# Patient Record
Sex: MALE | Race: WHITE | NOT HISPANIC OR LATINO | Employment: OTHER | URBAN - METROPOLITAN AREA
[De-identification: names, ages, dates, MRNs, and addresses within clinical notes are randomized per-mention and may not be internally consistent; named-entity substitution may affect disease eponyms.]

---

## 2023-04-25 ENCOUNTER — OFFICE VISIT (OUTPATIENT)
Dept: PRIMARY CARE | Facility: CLINIC | Age: 78
End: 2023-04-25
Payer: MEDICARE

## 2023-04-25 VITALS
HEART RATE: 99 BPM | TEMPERATURE: 97.6 F | WEIGHT: 260 LBS | BODY MASS INDEX: 41.78 KG/M2 | OXYGEN SATURATION: 95 % | SYSTOLIC BLOOD PRESSURE: 112 MMHG | RESPIRATION RATE: 16 BRPM | HEIGHT: 66 IN | DIASTOLIC BLOOD PRESSURE: 76 MMHG

## 2023-04-25 DIAGNOSIS — Z95.2 HISTORY OF HEART VALVE REPLACEMENT: ICD-10-CM

## 2023-04-25 DIAGNOSIS — Z13.39 ALCOHOL SCREENING: ICD-10-CM

## 2023-04-25 DIAGNOSIS — E66.01 CLASS 2 SEVERE OBESITY WITH BODY MASS INDEX (BMI) OF 35 TO 39.9 WITH SERIOUS COMORBIDITY (MULTI): ICD-10-CM

## 2023-04-25 DIAGNOSIS — Z00.00 ANNUAL PHYSICAL EXAM: ICD-10-CM

## 2023-04-25 DIAGNOSIS — Z79.4 TYPE 2 DIABETES MELLITUS WITHOUT COMPLICATION, WITH LONG-TERM CURRENT USE OF INSULIN (MULTI): Primary | ICD-10-CM

## 2023-04-25 DIAGNOSIS — E78.00 HYPERCHOLESTEROLEMIA: ICD-10-CM

## 2023-04-25 DIAGNOSIS — E55.9 VITAMIN D DEFICIENCY: ICD-10-CM

## 2023-04-25 DIAGNOSIS — Z00.00 MEDICARE ANNUAL WELLNESS VISIT, SUBSEQUENT: Primary | ICD-10-CM

## 2023-04-25 DIAGNOSIS — E11.9 TYPE 2 DIABETES MELLITUS WITHOUT COMPLICATION, WITHOUT LONG-TERM CURRENT USE OF INSULIN (MULTI): ICD-10-CM

## 2023-04-25 DIAGNOSIS — E78.5 HYPERLIPIDEMIA, UNSPECIFIED HYPERLIPIDEMIA TYPE: ICD-10-CM

## 2023-04-25 DIAGNOSIS — I10 HYPERTENSION, UNSPECIFIED TYPE: ICD-10-CM

## 2023-04-25 DIAGNOSIS — I48.20 CHRONIC ATRIAL FIBRILLATION (MULTI): ICD-10-CM

## 2023-04-25 DIAGNOSIS — E11.9 TYPE 2 DIABETES MELLITUS WITHOUT COMPLICATION, WITH LONG-TERM CURRENT USE OF INSULIN (MULTI): Primary | ICD-10-CM

## 2023-04-25 DIAGNOSIS — Z13.31 DEPRESSION SCREEN: ICD-10-CM

## 2023-04-25 DIAGNOSIS — Z71.89 ADVANCE DIRECTIVE DISCUSSED WITH PATIENT: ICD-10-CM

## 2023-04-25 PROBLEM — E53.8 VITAMIN B 12 DEFICIENCY: Status: ACTIVE | Noted: 2023-04-25

## 2023-04-25 PROBLEM — Z98.1 S/P CERVICAL SPINAL FUSION: Status: ACTIVE | Noted: 2023-04-25

## 2023-04-25 PROBLEM — S06.5XAA SUBDURAL HEMATOMA (MULTI): Status: ACTIVE | Noted: 2023-04-25

## 2023-04-25 PROBLEM — R26.9 GAIT DIFFICULTY: Status: ACTIVE | Noted: 2023-04-25

## 2023-04-25 PROBLEM — C61 PROSTATE CANCER (MULTI): Status: ACTIVE | Noted: 2023-04-25

## 2023-04-25 PROBLEM — I48.0 PAF (PAROXYSMAL ATRIAL FIBRILLATION) (MULTI): Status: ACTIVE | Noted: 2023-04-25

## 2023-04-25 PROBLEM — Z86.008 HISTORY OF CARCINOMA IN SITU OF BLADDER: Status: ACTIVE | Noted: 2023-04-25

## 2023-04-25 PROBLEM — N40.0 BPH (BENIGN PROSTATIC HYPERPLASIA): Status: ACTIVE | Noted: 2023-04-25

## 2023-04-25 PROBLEM — D64.9 ANEMIA: Status: ACTIVE | Noted: 2023-04-25

## 2023-04-25 PROBLEM — I25.10 CORONARY ARTERIOSCLEROSIS: Status: ACTIVE | Noted: 2023-04-25

## 2023-04-25 PROBLEM — E66.812 CLASS 2 SEVERE OBESITY WITH BODY MASS INDEX (BMI) OF 35 TO 39.9 WITH SERIOUS COMORBIDITY: Status: ACTIVE | Noted: 2023-04-25

## 2023-04-25 PROCEDURE — 1157F ADVNC CARE PLAN IN RCRD: CPT | Performed by: INTERNAL MEDICINE

## 2023-04-25 PROCEDURE — 99397 PER PM REEVAL EST PAT 65+ YR: CPT | Performed by: INTERNAL MEDICINE

## 2023-04-25 PROCEDURE — G0444 DEPRESSION SCREEN ANNUAL: HCPCS | Performed by: INTERNAL MEDICINE

## 2023-04-25 PROCEDURE — 1170F FXNL STATUS ASSESSED: CPT | Performed by: INTERNAL MEDICINE

## 2023-04-25 PROCEDURE — G0442 ANNUAL ALCOHOL SCREEN 15 MIN: HCPCS | Performed by: INTERNAL MEDICINE

## 2023-04-25 PROCEDURE — 1036F TOBACCO NON-USER: CPT | Performed by: INTERNAL MEDICINE

## 2023-04-25 PROCEDURE — 1160F RVW MEDS BY RX/DR IN RCRD: CPT | Performed by: INTERNAL MEDICINE

## 2023-04-25 PROCEDURE — 3074F SYST BP LT 130 MM HG: CPT | Performed by: INTERNAL MEDICINE

## 2023-04-25 PROCEDURE — G0446 INTENS BEHAVE THER CARDIO DX: HCPCS | Performed by: INTERNAL MEDICINE

## 2023-04-25 PROCEDURE — 99497 ADVNCD CARE PLAN 30 MIN: CPT | Performed by: INTERNAL MEDICINE

## 2023-04-25 PROCEDURE — 3078F DIAST BP <80 MM HG: CPT | Performed by: INTERNAL MEDICINE

## 2023-04-25 PROCEDURE — 1159F MED LIST DOCD IN RCRD: CPT | Performed by: INTERNAL MEDICINE

## 2023-04-25 PROCEDURE — G0439 PPPS, SUBSEQ VISIT: HCPCS | Performed by: INTERNAL MEDICINE

## 2023-04-25 PROCEDURE — 99213 OFFICE O/P EST LOW 20 MIN: CPT | Performed by: INTERNAL MEDICINE

## 2023-04-25 RX ORDER — TORSEMIDE 10 MG/1
10 TABLET ORAL DAILY
COMMUNITY
End: 2023-04-25 | Stop reason: SDUPTHER

## 2023-04-25 RX ORDER — METFORMIN HYDROCHLORIDE 500 MG/1
2000 TABLET, EXTENDED RELEASE ORAL DAILY
COMMUNITY
End: 2023-04-25 | Stop reason: SDUPTHER

## 2023-04-25 RX ORDER — WARFARIN SODIUM 2 MG/1
TABLET ORAL
COMMUNITY
End: 2023-04-25 | Stop reason: SDUPTHER

## 2023-04-25 RX ORDER — METOPROLOL TARTRATE 50 MG/1
1 TABLET ORAL 2 TIMES DAILY
COMMUNITY
Start: 2021-06-07 | End: 2023-04-25 | Stop reason: SDUPTHER

## 2023-04-25 RX ORDER — POTASSIUM CHLORIDE 750 MG/1
10 CAPSULE, EXTENDED RELEASE ORAL DAILY
COMMUNITY
End: 2023-04-25 | Stop reason: SDUPTHER

## 2023-04-25 RX ORDER — CHOLECALCIFEROL (VITAMIN D3) 25 MCG
1 TABLET ORAL DAILY
COMMUNITY
Start: 2019-10-23 | End: 2023-04-25 | Stop reason: SDUPTHER

## 2023-04-25 RX ORDER — TAMSULOSIN HYDROCHLORIDE 0.4 MG/1
0.4 CAPSULE ORAL DAILY
COMMUNITY
End: 2023-04-25 | Stop reason: SDUPTHER

## 2023-04-25 RX ORDER — SIMVASTATIN 40 MG/1
40 TABLET, FILM COATED ORAL DAILY
COMMUNITY
End: 2023-04-25 | Stop reason: SDUPTHER

## 2023-04-25 RX ORDER — LANOLIN ALCOHOL/MO/W.PET/CERES
1 CREAM (GRAM) TOPICAL DAILY
COMMUNITY
Start: 2021-06-21 | End: 2023-04-25 | Stop reason: SDUPTHER

## 2023-04-25 ASSESSMENT — PATIENT HEALTH QUESTIONNAIRE - PHQ9
SUM OF ALL RESPONSES TO PHQ9 QUESTIONS 1 AND 2: 0
1. LITTLE INTEREST OR PLEASURE IN DOING THINGS: NOT AT ALL
2. FEELING DOWN, DEPRESSED OR HOPELESS: NOT AT ALL

## 2023-04-25 ASSESSMENT — ACTIVITIES OF DAILY LIVING (ADL)
JUDGMENT_ADEQUATE_SAFELY_COMPLETE_DAILY_ACTIVITIES: YES
BATHING: INDEPENDENT
TOILETING: INDEPENDENT
GROOMING: INDEPENDENT
FEEDING YOURSELF: INDEPENDENT
PATIENT'S MEMORY ADEQUATE TO SAFELY COMPLETE DAILY ACTIVITIES?: YES
ADEQUATE_TO_COMPLETE_ADL: YES
HEARING - LEFT EAR: FUNCTIONAL
HEARING - RIGHT EAR: FUNCTIONAL
DRESSING YOURSELF: INDEPENDENT
WALKS IN HOME: INDEPENDENT

## 2023-04-25 ASSESSMENT — PAIN SCALES - GENERAL: PAINLEVEL: 0-NO PAIN

## 2023-04-25 NOTE — PATIENT INSTRUCTIONS
Plan:   Medicare wellness done  Physical done. Tests ordered  Upto date on pneumonia shots   Discussed about obesity and complications related to it. Discussed about weight reduction and regular exercise to decrease weight. Questions related to it answered to patient's satisfaction.   Current medications are effective. advised to continue current medications.  Blood tests ordered   F/U 6 months or as needed

## 2023-04-25 NOTE — PROGRESS NOTES
Pt is here for medicare wellness , ;physical and follow up     Here with wife    Hx of HTN , HLD, DM . Uses cane    Patient is independent with ADLs. Patient does drive.     No recent falls.    Patient denies any shortness of breath, PND, orthopnea, chest pain , palpitation, syncope or edema in legs  patient denies any abdominal pain, tenderness, nausea, vomiting, change in bowel habits or blood in stool.  Patient denies any weakness in extremities.. Denies any headache, visual symptoms , speech problems or  tremors . No TIA or stroke like symptoms..    During Medicare wellness apart from looking at assessment done by nurse,  I also asked following :    Alcohol use : Alcohol screening  was  done during this visit. Patient is not having any issue with increase alcohol use . No ETOH abuse was observed by history . Does not drink at all     HIV test: patient was not found to be high risk for HIV     Cognitive issue : None     Advanced Directive: Patient has advanced directive including living will and Health care power of . Health POA is wife . All questions related to it answered. Total time > 16 min.     I have reviewed all active medications patient is currently on . Questions related to medication answered to patient's satisfaction.    REVIEW OF SYSTEMS:   All other systems have been reviewed and are negative in relation to patient's complaint and other than what is mentioned in History of present illness.    Had pneumonia and shingle shots in pasts .     OBJECTIVE :  Morbidly obese  Vitals noted   Not in acute distress  Conj Pink, No icterus  Slight wax in ears  Neck:No Cervical LN enlargement, No Thyroid enlargement No carotid bruit  Lungs: good air entry bilaterally, no rales or rhonchi  CVS: S1 S2 + , no S3. No loud heart murmur heard.   Abdomen: Soft, non tender , BS +. no organomegaly , no CVA tenderness  MSK: No spine tenderness or muscle tenderness noted on gross examination  CNS: Pt is alert,  moving all 4 extremities. no motor weakness or abnormal movements noted on gross examination.  Extremities: No edema, No calf tenderness, Matthias's sign negative.    Assessment:    1. Medicare annual wellness visit, subsequent - done   2. Annual physical exam done. Tests ordered   3. Advance directive discussed with patient    4. Alcohol screening - done   5. Depression screen done   6. Hypertension, unspecified type - controlled     7. Hypercholesterolemia - hx of   8. Class 2 severe obesity with body mass index (BMI) of 35 to 39.9 with serious comorbidity (CMS/[facility])    9. History of heart valve replacement  -sees cardiologist . On coumadin   10. Type 2 diabetes mellitus without complication, without long-ter  m current use of insulin (CMS/[facility]) hx of .        During Medicare wellness patients chronic diagnosis were reviewed and questions related to it answered to patient's satisfaction . Risk factors for heart, stroke were discussed with patient . Discussion about preventative tests were done with patient also . pain issue was discussed as appropriate for patient .  ASCVD score was about 49 %. Discussed with pt     Plan:   Medicare wellness done  Physical done. Tests ordered  Upto date on pneumonia shots   Discussed about obesity and complications related to it. Discussed about weight reduction and regular exercise to decrease weight. Questions related to it answered to patient's satisfaction.   Current medications are effective. advised to continue current medications.  Blood tests ordered   F/U 6 months or as needed

## 2023-04-26 RX ORDER — POTASSIUM CHLORIDE 750 MG/1
10 CAPSULE, EXTENDED RELEASE ORAL DAILY
Qty: 90 CAPSULE | Refills: 3 | Status: SHIPPED | OUTPATIENT
Start: 2023-04-26 | End: 2023-10-17 | Stop reason: SDUPTHER

## 2023-04-26 RX ORDER — TAMSULOSIN HYDROCHLORIDE 0.4 MG/1
0.4 CAPSULE ORAL DAILY
Qty: 30 CAPSULE | Refills: 2 | Status: SHIPPED | OUTPATIENT
Start: 2023-04-26 | End: 2023-11-29 | Stop reason: SDUPTHER

## 2023-04-26 RX ORDER — METFORMIN HYDROCHLORIDE 500 MG/1
2000 TABLET, EXTENDED RELEASE ORAL DAILY
Qty: 90 TABLET | Refills: 3 | Status: SHIPPED | OUTPATIENT
Start: 2023-04-26 | End: 2023-10-17 | Stop reason: SDUPTHER

## 2023-04-26 RX ORDER — CHOLECALCIFEROL (VITAMIN D3) 25 MCG
25 TABLET ORAL DAILY
Qty: 90 TABLET | Refills: 3 | Status: SHIPPED | OUTPATIENT
Start: 2023-04-26 | End: 2023-11-29 | Stop reason: SDUPTHER

## 2023-04-26 RX ORDER — TORSEMIDE 10 MG/1
10 TABLET ORAL DAILY
Qty: 30 TABLET | Refills: 2 | Status: SHIPPED | OUTPATIENT
Start: 2023-04-26 | End: 2023-10-17 | Stop reason: SDUPTHER

## 2023-04-26 RX ORDER — METOPROLOL TARTRATE 50 MG/1
50 TABLET ORAL 2 TIMES DAILY
Qty: 60 TABLET | Refills: 2 | Status: SHIPPED | OUTPATIENT
Start: 2023-04-26 | End: 2023-10-17 | Stop reason: SDUPTHER

## 2023-04-26 RX ORDER — WARFARIN SODIUM 2 MG/1
TABLET ORAL
Qty: 100 TABLET | Refills: 3 | Status: SHIPPED | OUTPATIENT
Start: 2023-04-26 | End: 2023-11-29 | Stop reason: WASHOUT

## 2023-04-26 RX ORDER — SIMVASTATIN 40 MG/1
40 TABLET, FILM COATED ORAL DAILY
Qty: 30 TABLET | Refills: 2 | Status: SHIPPED | OUTPATIENT
Start: 2023-04-26 | End: 2023-11-29 | Stop reason: SDUPTHER

## 2023-04-26 RX ORDER — LANOLIN ALCOHOL/MO/W.PET/CERES
1000 CREAM (GRAM) TOPICAL DAILY
Qty: 90 TABLET | Refills: 3 | Status: SHIPPED | OUTPATIENT
Start: 2023-04-26 | End: 2023-11-29 | Stop reason: SDUPTHER

## 2023-08-21 PROBLEM — E87.6 HYPOKALEMIA: Status: ACTIVE | Noted: 2023-08-21

## 2023-08-21 PROBLEM — N20.0 KIDNEY STONE: Status: ACTIVE | Noted: 2023-08-21

## 2023-08-21 PROBLEM — H35.81 RETINAL EDEMA OF RIGHT EYE: Status: ACTIVE | Noted: 2023-08-21

## 2023-08-21 PROBLEM — S29.9XXA THORACIC INJURY: Status: ACTIVE | Noted: 2023-08-21

## 2023-08-21 PROBLEM — R60.0 EDEMA OF BOTH LEGS: Status: ACTIVE | Noted: 2023-08-21

## 2023-08-21 PROBLEM — R53.1 WEAKNESS: Status: ACTIVE | Noted: 2023-08-21

## 2023-08-21 PROBLEM — R13.10 DYSPHAGIA: Status: ACTIVE | Noted: 2021-04-13

## 2023-08-21 RX ORDER — ACETAMINOPHEN 500 MG
1 TABLET ORAL DAILY
COMMUNITY
Start: 2021-06-07 | End: 2023-10-17 | Stop reason: SDUPTHER

## 2023-08-21 RX ORDER — ACETAMINOPHEN 325 MG/1
3 TABLET ORAL EVERY 8 HOURS
COMMUNITY
Start: 2021-06-07 | End: 2023-11-29 | Stop reason: WASHOUT

## 2023-08-21 RX ORDER — WARFARIN 2 MG/1
TABLET ORAL
COMMUNITY
Start: 2009-10-20 | End: 2023-10-17 | Stop reason: SDUPTHER

## 2023-08-21 RX ORDER — METOPROLOL TARTRATE 25 MG/1
3 TABLET, FILM COATED ORAL 2 TIMES DAILY
COMMUNITY
Start: 2021-06-07 | End: 2023-11-29 | Stop reason: WASHOUT

## 2023-08-21 RX ORDER — ATORVASTATIN CALCIUM 10 MG/1
20 TABLET, FILM COATED ORAL
COMMUNITY
Start: 2009-10-20 | End: 2023-11-29 | Stop reason: WASHOUT

## 2023-08-21 RX ORDER — ASPIRIN 81 MG/1
1 TABLET ORAL DAILY
COMMUNITY
Start: 2021-05-16 | End: 2023-11-29 | Stop reason: WASHOUT

## 2023-08-21 RX ORDER — GLIMEPIRIDE 1 MG/1
1 TABLET ORAL DAILY
COMMUNITY
Start: 2009-10-20 | End: 2023-11-29 | Stop reason: WASHOUT

## 2023-08-21 RX ORDER — ESOMEPRAZOLE MAGNESIUM 40 MG/1
1 CAPSULE, DELAYED RELEASE ORAL DAILY
COMMUNITY
Start: 2009-12-30 | End: 2023-11-29 | Stop reason: WASHOUT

## 2023-08-21 RX ORDER — AMIODARONE HYDROCHLORIDE 200 MG/1
1 TABLET ORAL DAILY
COMMUNITY
Start: 2010-01-06 | End: 2023-11-29 | Stop reason: WASHOUT

## 2023-08-21 RX ORDER — FERROUS SULFATE 325(65) MG
1 TABLET ORAL 2 TIMES DAILY
COMMUNITY
Start: 2009-12-14 | End: 2023-11-29 | Stop reason: WASHOUT

## 2023-08-21 RX ORDER — METFORMIN HYDROCHLORIDE 500 MG/1
4 TABLET, EXTENDED RELEASE ORAL NIGHTLY
COMMUNITY
Start: 2021-03-20

## 2023-08-21 RX ORDER — SIMVASTATIN 40 MG/1
1 TABLET, FILM COATED ORAL DAILY
COMMUNITY
Start: 2021-03-20 | End: 2023-10-17 | Stop reason: SDUPTHER

## 2023-08-21 RX ORDER — LANOLIN ALCOHOL/MO/W.PET/CERES
1 CREAM (GRAM) TOPICAL DAILY
COMMUNITY
Start: 2021-05-16 | End: 2023-10-17 | Stop reason: SDUPTHER

## 2023-08-21 RX ORDER — LISINOPRIL AND HYDROCHLOROTHIAZIDE 20; 25 MG/1; MG/1
1 TABLET ORAL DAILY
COMMUNITY
Start: 2009-10-20 | End: 2023-11-29 | Stop reason: WASHOUT

## 2023-08-21 RX ORDER — TAMSULOSIN HYDROCHLORIDE 0.4 MG/1
1 CAPSULE ORAL DAILY
COMMUNITY
Start: 2021-03-20 | End: 2023-10-17 | Stop reason: SDUPTHER

## 2023-09-21 ENCOUNTER — LAB (OUTPATIENT)
Dept: LAB | Facility: LAB | Age: 78
End: 2023-09-21
Payer: MEDICARE

## 2023-09-21 DIAGNOSIS — E11.9 TYPE 2 DIABETES MELLITUS WITHOUT COMPLICATION, WITHOUT LONG-TERM CURRENT USE OF INSULIN (MULTI): ICD-10-CM

## 2023-09-21 LAB
ALANINE AMINOTRANSFERASE (SGPT) (U/L) IN SER/PLAS: 14 U/L (ref 10–52)
ALBUMIN (G/DL) IN SER/PLAS: 4.2 G/DL (ref 3.4–5)
ALBUMIN (MG/L) IN URINE: <7 MG/L
ALBUMIN/CREATININE (UG/MG) IN URINE: NORMAL UG/MG CRT (ref 0–30)
ALKALINE PHOSPHATASE (U/L) IN SER/PLAS: 57 U/L (ref 33–136)
ANION GAP IN SER/PLAS: 13 MMOL/L (ref 10–20)
ASPARTATE AMINOTRANSFERASE (SGOT) (U/L) IN SER/PLAS: 19 U/L (ref 9–39)
BASOPHILS (10*3/UL) IN BLOOD BY AUTOMATED COUNT: 0.09 X10E9/L (ref 0–0.1)
BASOPHILS/100 LEUKOCYTES IN BLOOD BY AUTOMATED COUNT: 1.1 % (ref 0–2)
BILIRUBIN DIRECT (MG/DL) IN SER/PLAS: 0.2 MG/DL (ref 0–0.3)
BILIRUBIN TOTAL (MG/DL) IN SER/PLAS: 1 MG/DL (ref 0–1.2)
CALCIUM (MG/DL) IN SER/PLAS: 10.1 MG/DL (ref 8.6–10.3)
CARBON DIOXIDE, TOTAL (MMOL/L) IN SER/PLAS: 25 MMOL/L (ref 21–32)
CHLORIDE (MMOL/L) IN SER/PLAS: 104 MMOL/L (ref 98–107)
CHOLESTEROL (MG/DL) IN SER/PLAS: 116 MG/DL (ref 0–199)
CHOLESTEROL IN HDL (MG/DL) IN SER/PLAS: 37.3 MG/DL
CHOLESTEROL/HDL RATIO: 3.1
CREATININE (MG/DL) IN SER/PLAS: 1.41 MG/DL (ref 0.5–1.3)
CREATININE (MG/DL) IN URINE: 41.5 MG/DL (ref 20–370)
EOSINOPHILS (10*3/UL) IN BLOOD BY AUTOMATED COUNT: 0.31 X10E9/L (ref 0–0.4)
EOSINOPHILS/100 LEUKOCYTES IN BLOOD BY AUTOMATED COUNT: 3.8 % (ref 0–6)
ERYTHROCYTE DISTRIBUTION WIDTH (RATIO) BY AUTOMATED COUNT: 13.1 % (ref 11.5–14.5)
ERYTHROCYTE MEAN CORPUSCULAR HEMOGLOBIN CONCENTRATION (G/DL) BY AUTOMATED: 32.1 G/DL (ref 32–36)
ERYTHROCYTE MEAN CORPUSCULAR VOLUME (FL) BY AUTOMATED COUNT: 94 FL (ref 80–100)
ERYTHROCYTES (10*6/UL) IN BLOOD BY AUTOMATED COUNT: 4.11 X10E12/L (ref 4.5–5.9)
GFR MALE: 51 ML/MIN/1.73M2
GLUCOSE (MG/DL) IN SER/PLAS: 190 MG/DL (ref 74–99)
HEMATOCRIT (%) IN BLOOD BY AUTOMATED COUNT: 38.6 % (ref 41–52)
HEMOGLOBIN (G/DL) IN BLOOD: 12.4 G/DL (ref 13.5–17.5)
IMMATURE GRANULOCYTES/100 LEUKOCYTES IN BLOOD BY AUTOMATED COUNT: 0.5 % (ref 0–0.9)
LEUKOCYTES (10*3/UL) IN BLOOD BY AUTOMATED COUNT: 8.1 X10E9/L (ref 4.4–11.3)
LYMPHOCYTES (10*3/UL) IN BLOOD BY AUTOMATED COUNT: 1.85 X10E9/L (ref 0.8–3)
LYMPHOCYTES/100 LEUKOCYTES IN BLOOD BY AUTOMATED COUNT: 22.8 % (ref 13–44)
MONOCYTES (10*3/UL) IN BLOOD BY AUTOMATED COUNT: 0.87 X10E9/L (ref 0.05–0.8)
MONOCYTES/100 LEUKOCYTES IN BLOOD BY AUTOMATED COUNT: 10.7 % (ref 2–10)
NEUTROPHILS (10*3/UL) IN BLOOD BY AUTOMATED COUNT: 4.94 X10E9/L (ref 1.6–5.5)
NEUTROPHILS/100 LEUKOCYTES IN BLOOD BY AUTOMATED COUNT: 61.1 % (ref 40–80)
NON-HDL CHOLESTEROL: 79 MG/DL
NRBC (PER 100 WBCS) BY AUTOMATED COUNT: 0 /100 WBC (ref 0–0)
PLATELETS (10*3/UL) IN BLOOD AUTOMATED COUNT: 228 X10E9/L (ref 150–450)
POTASSIUM (MMOL/L) IN SER/PLAS: 4.1 MMOL/L (ref 3.5–5.3)
PROTEIN TOTAL: 7.1 G/DL (ref 6.4–8.2)
SODIUM (MMOL/L) IN SER/PLAS: 138 MMOL/L (ref 136–145)
THYROTROPIN (MIU/L) IN SER/PLAS BY DETECTION LIMIT <= 0.05 MIU/L: 1.78 MIU/L (ref 0.44–3.98)
UREA NITROGEN (MG/DL) IN SER/PLAS: 24 MG/DL (ref 6–23)

## 2023-09-21 PROCEDURE — 82570 ASSAY OF URINE CREATININE: CPT

## 2023-09-21 PROCEDURE — 83036 HEMOGLOBIN GLYCOSYLATED A1C: CPT

## 2023-09-21 PROCEDURE — 84443 ASSAY THYROID STIM HORMONE: CPT

## 2023-09-21 PROCEDURE — 82465 ASSAY BLD/SERUM CHOLESTEROL: CPT

## 2023-09-21 PROCEDURE — 83718 ASSAY OF LIPOPROTEIN: CPT

## 2023-09-21 PROCEDURE — 80048 BASIC METABOLIC PNL TOTAL CA: CPT

## 2023-09-21 PROCEDURE — 36415 COLL VENOUS BLD VENIPUNCTURE: CPT

## 2023-09-21 PROCEDURE — 80076 HEPATIC FUNCTION PANEL: CPT

## 2023-09-21 PROCEDURE — 82043 UR ALBUMIN QUANTITATIVE: CPT

## 2023-09-21 PROCEDURE — 85025 COMPLETE CBC W/AUTO DIFF WBC: CPT

## 2023-09-22 LAB
ESTIMATED AVERAGE GLUCOSE FOR HBA1C: 203 MG/DL
HEMOGLOBIN A1C/HEMOGLOBIN TOTAL IN BLOOD: 8.7 %

## 2023-09-25 ENCOUNTER — TELEPHONE (OUTPATIENT)
Dept: PRIMARY CARE | Facility: CLINIC | Age: 78
End: 2023-09-25
Payer: MEDICARE

## 2023-09-26 ENCOUNTER — TELEPHONE (OUTPATIENT)
Dept: PRIMARY CARE | Facility: CLINIC | Age: 78
End: 2023-09-26
Payer: MEDICARE

## 2023-09-27 ENCOUNTER — TELEPHONE (OUTPATIENT)
Dept: PRIMARY CARE | Facility: CLINIC | Age: 78
End: 2023-09-27
Payer: MEDICARE

## 2023-10-13 ENCOUNTER — ANTICOAGULATION - WARFARIN VISIT (OUTPATIENT)
Dept: PHARMACY | Facility: CLINIC | Age: 78
End: 2023-10-13
Payer: MEDICARE

## 2023-10-13 DIAGNOSIS — I48.91 ATRIAL FIBRILLATION, UNSPECIFIED TYPE (MULTI): Primary | ICD-10-CM

## 2023-10-13 LAB
POC INR: 3.4
POC PROTHROMBIN TIME: NORMAL

## 2023-10-13 PROCEDURE — 85610 PROTHROMBIN TIME: CPT | Performed by: PHARMACIST

## 2023-10-13 NOTE — PROGRESS NOTES
Coumadin Clinic Visit Note    Patient verified warfarin dose  No missed doses  No unusual bruising or bleeding ,had covid  on 10/2 , had paxlovid an cough medicine and completely recovered now , cough stopped , will decrease dose since last month was 3.8 and today 3.4 so been trending high although this nmoth may be due to illness   No changes to medications  Consistent dietary green intake  No anticipated procedures at this time  INR Supratherapeutic today at 3.4  No changes to warfarin dose today  Next appointment 5 weeks      Jeanne Carter, RonitD

## 2023-10-17 DIAGNOSIS — E55.9 VITAMIN D DEFICIENCY: ICD-10-CM

## 2023-10-17 DIAGNOSIS — I48.91 ATRIAL FIBRILLATION, UNSPECIFIED TYPE (MULTI): ICD-10-CM

## 2023-10-17 DIAGNOSIS — N40.0 BENIGN PROSTATIC HYPERPLASIA, UNSPECIFIED WHETHER LOWER URINARY TRACT SYMPTOMS PRESENT: Primary | ICD-10-CM

## 2023-10-17 DIAGNOSIS — E11.9 TYPE 2 DIABETES MELLITUS WITHOUT COMPLICATION, WITH LONG-TERM CURRENT USE OF INSULIN (MULTI): ICD-10-CM

## 2023-10-17 DIAGNOSIS — Z79.4 TYPE 2 DIABETES MELLITUS WITHOUT COMPLICATION, WITH LONG-TERM CURRENT USE OF INSULIN (MULTI): ICD-10-CM

## 2023-10-17 DIAGNOSIS — E53.8 VITAMIN B12 DEFICIENCY: ICD-10-CM

## 2023-10-18 RX ORDER — SIMVASTATIN 40 MG/1
40 TABLET, FILM COATED ORAL DAILY
Qty: 80 TABLET | Refills: 3 | Status: SHIPPED | OUTPATIENT
Start: 2023-10-18

## 2023-10-18 RX ORDER — TAMSULOSIN HYDROCHLORIDE 0.4 MG/1
0.4 CAPSULE ORAL DAILY
Qty: 90 CAPSULE | Refills: 3 | Status: SHIPPED | OUTPATIENT
Start: 2023-10-18

## 2023-10-18 RX ORDER — WARFARIN 2 MG/1
2 TABLET ORAL EVERY EVENING
Qty: 100 TABLET | Refills: 2 | Status: SHIPPED | OUTPATIENT
Start: 2023-10-18 | End: 2024-10-17

## 2023-10-18 RX ORDER — TORSEMIDE 10 MG/1
10 TABLET ORAL DAILY
Qty: 30 TABLET | Refills: 2 | Status: SHIPPED | OUTPATIENT
Start: 2023-10-18 | End: 2024-01-15

## 2023-10-18 RX ORDER — METFORMIN HYDROCHLORIDE 500 MG/1
2000 TABLET, EXTENDED RELEASE ORAL DAILY
Qty: 90 TABLET | Refills: 3 | Status: SHIPPED | OUTPATIENT
Start: 2023-10-18 | End: 2023-11-29 | Stop reason: WASHOUT

## 2023-10-18 RX ORDER — ACETAMINOPHEN 500 MG
2000 TABLET ORAL DAILY
Qty: 90 CAPSULE | Refills: 3 | Status: SHIPPED | OUTPATIENT
Start: 2023-10-18

## 2023-10-18 RX ORDER — LANOLIN ALCOHOL/MO/W.PET/CERES
1000 CREAM (GRAM) TOPICAL DAILY
Qty: 90 TABLET | Refills: 3 | Status: SHIPPED | OUTPATIENT
Start: 2023-10-18

## 2023-10-18 RX ORDER — METOPROLOL TARTRATE 50 MG/1
50 TABLET ORAL 2 TIMES DAILY
Qty: 60 TABLET | Refills: 2 | Status: SHIPPED | OUTPATIENT
Start: 2023-10-18 | End: 2024-01-22

## 2023-10-18 RX ORDER — POTASSIUM CHLORIDE 750 MG/1
10 CAPSULE, EXTENDED RELEASE ORAL DAILY
Qty: 90 CAPSULE | Refills: 3 | Status: SHIPPED | OUTPATIENT
Start: 2023-10-18

## 2023-10-23 ENCOUNTER — TELEPHONE (OUTPATIENT)
Dept: PRIMARY CARE | Facility: CLINIC | Age: 78
End: 2023-10-23
Payer: MEDICARE

## 2023-10-23 NOTE — TELEPHONE ENCOUNTER
Pharmacy called to clarify the quantity to be dispensed of metformin and simvastatin.     Please review, clarify and resend as appropriate.

## 2023-10-24 ENCOUNTER — APPOINTMENT (OUTPATIENT)
Dept: PRIMARY CARE | Facility: CLINIC | Age: 78
End: 2023-10-24
Payer: MEDICARE

## 2023-11-17 ENCOUNTER — ANTICOAGULATION - WARFARIN VISIT (OUTPATIENT)
Dept: PHARMACY | Facility: CLINIC | Age: 78
End: 2023-11-17
Payer: MEDICARE

## 2023-11-17 DIAGNOSIS — I48.91 ATRIAL FIBRILLATION, UNSPECIFIED TYPE (MULTI): Primary | ICD-10-CM

## 2023-11-17 LAB
POC INR: 3.8
POC PROTHROMBIN TIME: NORMAL

## 2023-11-17 PROCEDURE — 99212 OFFICE O/P EST SF 10 MIN: CPT

## 2023-11-17 PROCEDURE — 85610 PROTHROMBIN TIME: CPT

## 2023-11-17 NOTE — PROGRESS NOTES
No bleeding or unusual bruising.  Medications and doses verified.  No scheduled procedures at this time.  INR=3.8   Patient admits to not eating well, no salads while wife is in NH.   Recommended frozen vegies or pre packed salads.   Plan: Hold 1 dose today, weekly dose decreased.  Follow up INR check in 2 weeks.

## 2023-11-22 PROBLEM — Z95.2 S/P AVR (AORTIC VALVE REPLACEMENT): Chronic | Status: ACTIVE | Noted: 2023-04-25

## 2023-11-22 PROBLEM — I48.0 PAROXYSMAL ATRIAL FIBRILLATION (MULTI): Chronic | Status: ACTIVE | Noted: 2023-04-25

## 2023-11-22 PROBLEM — E78.00 HYPERCHOLESTEROLEMIA: Chronic | Status: ACTIVE | Noted: 2023-04-25

## 2023-11-22 PROBLEM — I25.10 CORONARY ARTERIOSCLEROSIS: Chronic | Status: ACTIVE | Noted: 2023-04-25

## 2023-11-22 PROBLEM — E66.01 SEVERE OBESITY (MULTI): Status: ACTIVE | Noted: 2023-04-25

## 2023-11-28 PROBLEM — I10 HYPERTENSION: Chronic | Status: ACTIVE | Noted: 2023-04-25

## 2023-11-28 PROBLEM — S06.5XAA SUBDURAL HEMATOMA (MULTI): Chronic | Status: ACTIVE | Noted: 2023-04-25

## 2023-11-29 ENCOUNTER — OFFICE VISIT (OUTPATIENT)
Dept: CARDIOLOGY | Facility: CLINIC | Age: 78
End: 2023-11-29
Payer: MEDICARE

## 2023-11-29 VITALS
HEART RATE: 96 BPM | WEIGHT: 253 LBS | DIASTOLIC BLOOD PRESSURE: 82 MMHG | SYSTOLIC BLOOD PRESSURE: 122 MMHG | OXYGEN SATURATION: 98 % | BODY MASS INDEX: 40.84 KG/M2

## 2023-11-29 DIAGNOSIS — I48.0 PAROXYSMAL ATRIAL FIBRILLATION (MULTI): Chronic | ICD-10-CM

## 2023-11-29 DIAGNOSIS — I10 PRIMARY HYPERTENSION: Chronic | ICD-10-CM

## 2023-11-29 DIAGNOSIS — S06.5XAA SUBDURAL HEMATOMA (MULTI): Chronic | ICD-10-CM

## 2023-11-29 DIAGNOSIS — E78.00 HYPERCHOLESTEROLEMIA: Chronic | ICD-10-CM

## 2023-11-29 DIAGNOSIS — I25.10 CORONARY ARTERIOSCLEROSIS: Chronic | ICD-10-CM

## 2023-11-29 DIAGNOSIS — Z95.2 S/P AVR (AORTIC VALVE REPLACEMENT): Primary | Chronic | ICD-10-CM

## 2023-11-29 PROCEDURE — 1160F RVW MEDS BY RX/DR IN RCRD: CPT | Performed by: INTERNAL MEDICINE

## 2023-11-29 PROCEDURE — 3079F DIAST BP 80-89 MM HG: CPT | Performed by: INTERNAL MEDICINE

## 2023-11-29 PROCEDURE — 1159F MED LIST DOCD IN RCRD: CPT | Performed by: INTERNAL MEDICINE

## 2023-11-29 PROCEDURE — 99214 OFFICE O/P EST MOD 30 MIN: CPT | Performed by: INTERNAL MEDICINE

## 2023-11-29 PROCEDURE — 1126F AMNT PAIN NOTED NONE PRSNT: CPT | Performed by: INTERNAL MEDICINE

## 2023-11-29 PROCEDURE — 3074F SYST BP LT 130 MM HG: CPT | Performed by: INTERNAL MEDICINE

## 2023-11-29 PROCEDURE — 1036F TOBACCO NON-USER: CPT | Performed by: INTERNAL MEDICINE

## 2023-11-29 RX ORDER — LEVOTHYROXINE SODIUM 50 UG/1
50 TABLET ORAL
COMMUNITY
End: 2023-11-29 | Stop reason: ENTERED-IN-ERROR

## 2023-11-29 NOTE — PATIENT INSTRUCTIONS
1. permanent A-fib with a chads vascular score of 4.  He is on Coumadin.  He did have a previous fall and subdural hematoma.  In the future if falls become an issue placement of a watchman left atrial appendage occlusive device would be reasonable     2. Aortic stenosis status post aortic valve replacement in 2009 with a #25 Keon-Ring bioprosthetic valve. Well-seated with an acceptable gradient on the echo of May 2021.  Repeat echo will be done September 2024     3. CAD. Mild nonobstructive disease on a catheterization in 2009. Continue aspirin continue statins and beta-blockers.     4. Hyperlipidemia. This is monitored by his primary care doctor.  Target LDL less than 70     5. Weight gain.  Currently his weight is stable.  Reducing weight will be of benefit to him.    Return to see me 1 year.  Echo September 2024 before he sees me.

## 2023-11-29 NOTE — PROGRESS NOTES
HPI Feeling fine. No CP/SOB. Wife in hospital for UTI>     Past Medical History:  Problem List Items Addressed This Visit    None       Past Medical History:   Diagnosis Date    Coronary arteriosclerosis 04/25/2023 11/24/2009 AVR: 25 mm Keon-Ring valve - mild on cath before surgery    Dorsalgia, unspecified 08/27/2019    Back pain, acute    Encounter for immunization 02/22/2016    Need for Tdap vaccination    Horseshoe tear of retina without detachment, left eye 02/22/2016    Retinal tear, left    Hypercholesterolemia 04/25/2023    Dr. Umana    Overweight     Overweight (BMI 25.0-29.9)    Paroxysmal atrial fibrillation (CMS/HCC) 04/25/2023    On Coumadin. Had SDH d/t fall. No falls since.    Personal history of other diseases of the circulatory system 04/23/2020    History of atrial fibrillation    Personal history of other drug therapy 10/09/2015    History of influenza vaccination    Personal history of other endocrine, nutritional and metabolic disease 05/18/2018    History of diabetes mellitus    Personal history of other infectious and parasitic diseases     History of Legionnaire's disease    S/P AVR (aortic valve replacement) 04/25/2023 11/24/2009 AVR: 25 mm Keon-Ring valve             Past Surgical History:  He has a past surgical history that includes Cholecystectomy (11/26/2014); Colonoscopy (10/25/2021); Other surgical history (08/30/2021); and Aortic valve replacement (08/30/2021).      Social History:  He reports that he has never smoked. He has never used smokeless tobacco. He reports that he does not drink alcohol and does not use drugs.    Family History:  Family History   Problem Relation Name Age of Onset    Other (parkinsons disease) Mother      Other (cardiac disorder) Father          Allergies:  Codeine    Outpatient Medications:  Current Outpatient Medications   Medication Instructions    acetaminophen (TylenoL) 325 mg tablet 3 tablets, oral, Every 8 hours     amiodarone (Pacerone) 200 mg tablet 1 tablet, oral, Daily    aspirin (Adult Low Dose Aspirin) 81 mg EC tablet 1 tablet, oral, Daily    atorvastatin (Lipitor) 10 mg tablet 1 tablet, oral, Daily    cholecalciferol (VITAMIN D-3) 25 mcg, oral, Daily    cholecalciferol (VITAMIN D-3) 2,000 Units, oral, Daily    cyanocobalamin (VITAMIN B-12) 1,000 mcg, oral, Daily    cyanocobalamin (VITAMIN B-12) 1,000 mcg, oral, Daily    esomeprazole (NexIUM) 40 mg DR capsule 1 capsule, oral, Daily    ferrous sulfate 325 (65 Fe) MG tablet 1 tablet, oral, 2 times daily, With food    glimepiride (AmaryL) 1 mg tablet 1 tablet, oral, Daily    Jantoven 2 mg tablet TAKE 1 TABLET BY MOUTH EVERY DAY EXCEPT  TAKE 1 AND 1/2 TABLET ON MONDAY WEDNESDAY FRIDAY  AND SATURDAY    lisinopriL-hydrochlorothiazide 20-25 mg tablet 1 tablet, oral, Daily    metFORMIN XR (Glucophage-XR) 500 mg 24 hr tablet 4 tablets, oral, Nightly    metFORMIN XR (GLUCOPHAGE-XR) 2,000 mg, oral, Daily    metoprolol tartrate (Lopressor) 25 mg tablet 3 tablets, oral, 2 times daily    metoprolol tartrate (LOPRESSOR) 50 mg, oral, 2 times daily    potassium chloride ER (Micro-K) 10 mEq ER capsule 10 mEq, oral, Daily    simvastatin (ZOCOR) 40 mg, oral, Daily    simvastatin (ZOCOR) 40 mg, oral, Daily    tamsulosin (FLOMAX) 0.4 mg, oral, Daily    tamsulosin (FLOMAX) 0.4 mg, oral, Daily    torsemide (DEMADEX) 10 mg, oral, Daily    warfarin (COUMADIN) 2 mg, oral, Every evening, Take 1 tablet daily except 1 1/2 tablets on ThursdayTake 1 tablet daily except 1 1/2 tablets on Thursday        Last Recorded Vitals:  There were no vitals filed for this visit.    Physical Exam    Physical  Patient is alert and oriented x3.  HEENT is unremarkable mucous members are moist  Neck no JVP no bruits upstrokes are full no thyromegaly  Lungs are clear bilaterally.  No wheezing crackles or rales  Heart irregular rhythm normal S1-S2 there is no S3 no murmurs are heard.  Abdomen is soft vessels are positive  nontender nondistended no organomegaly no pulsatile masses  Extremities have no edema.  Distal pulses present palpable.  Neuro is grossly nonfocal  Skin has no rashes     Last Labs:  CBC -  Lab Results   Component Value Date    WBC 8.1 09/21/2023    HGB 12.4 (L) 09/21/2023    HCT 38.6 (L) 09/21/2023    MCV 94 09/21/2023     09/21/2023       CMP -  Lab Results   Component Value Date    CALCIUM 10.1 09/21/2023    PHOS 2.6 05/24/2021    PROT 7.1 09/21/2023    ALBUMIN 4.2 09/21/2023    AST 19 09/21/2023    ALT 14 09/21/2023    ALKPHOS 57 09/21/2023    BILITOT 1.0 09/21/2023       LIPID PANEL -   Lab Results   Component Value Date    CHOL 116 09/21/2023    HDL 37.3 (A) 09/21/2023    CHHDL 3.1 09/21/2023       RENAL FUNCTION PANEL -   Lab Results   Component Value Date    K 4.1 09/21/2023    PHOS 2.6 05/24/2021       Lab Results   Component Value Date     (H) 05/21/2021    HGBA1C 8.7 (A) 09/21/2023     Procedure  ECHO [05/21/2021]: EF 55-60%. Abnormal septal motion c/w postop status. Absent A-wave c/w A-fib. Bioprosthetic AV. Prior gradients (from 4/15/2019) were 21/10 peak/mean respectively. Current 28.58/12.84. RVSP WNL.     ECHO (04/15/2019): Normal LVF 65-70% EF. Mild AVR. Mild AS peak/mean 21-10mmHg, MARQUES 1.3cm2.     ECHO [04/27/2017]: LVF Normal, 55% EF. Mod. MAC. Mild AVS.     ECHO [04/15/2015]: Tech very limited study. Endocardial borders not well visualized in most views. Grossly low normal to mildly reduced LVF, est EF 50%. Mod, possible severe, CLVH and Doppler ev/of stage I diastolic dysfunction. Mod left atrial enlargement. Mild aortic stenosis. Grossly normal mitral/tricuspid valves.     CATH [11/16/2009]: Sev AV stenosis w/ peak-peak gradient of 50 mmHg, preserved LVF w/ EF 65%, large coronary arteries w/ min obstructive CAD.     Aortic Valve Repair Â  11/24/09: AVR #25 Keon-Ring valv     CATH [06/20/2006]: Very minimal coronary atherosclerosis. Preserved LVF.          Assessment/Plan    1. permanent A-fib with a chads vascular score of 4.  He is on Coumadin.  He did have a previous fall and subdural hematoma.  In the future if falls become an issue placement of a watchman left atrial appendage occlusive device would be reasonable     2. Aortic stenosis status post aortic valve replacement in 2009 with a #25 Keon-Ring bioprosthetic valve. Well-seated with an acceptable gradient on the echo of May 2021.  Repeat echo will be done September 2024     3. CAD. Mild nonobstructive disease on a catheterization in 2009. Continue aspirin continue statins and beta-blockers.     4. Hyperlipidemia. This is monitored by his primary care doctor.  Target LDL less than 70     5. Weight gain.  Currently his weight is stable.  Reducing weight will be of benefit to him.    Return to see me 1 year.  Echo September 2024 before he sees me.    Rosy Coto MD     Instructions and follow up

## 2023-12-04 ENCOUNTER — ANTICOAGULATION - WARFARIN VISIT (OUTPATIENT)
Dept: PHARMACY | Facility: CLINIC | Age: 78
End: 2023-12-04
Payer: MEDICARE

## 2023-12-04 DIAGNOSIS — I48.0 PAROXYSMAL ATRIAL FIBRILLATION (MULTI): Primary | Chronic | ICD-10-CM

## 2023-12-04 LAB
POC INR: 2.4
POC PROTHROMBIN TIME: NORMAL

## 2023-12-04 PROCEDURE — 85610 PROTHROMBIN TIME: CPT | Mod: QW | Performed by: PHARMACIST

## 2023-12-04 NOTE — PROGRESS NOTES
Verified current decreased dose with pt and that he held 1 dose at last visit.    No new meds or med changes since last visit.  Pt denies unusual bleed/bruise.  No upcoming procedures.  No missed doses  Pt says he has been eating more greens than usual, explained not to eat too much because we just decreased dose and inr will decrease.  Pt understands.    Inr = 2.4  Continue same dose and check again in 4 weeks.

## 2024-01-05 ENCOUNTER — APPOINTMENT (OUTPATIENT)
Dept: PHARMACY | Facility: CLINIC | Age: 79
End: 2024-01-05
Payer: MEDICARE

## 2024-01-12 ENCOUNTER — CLINICAL SUPPORT (OUTPATIENT)
Dept: PHARMACY | Facility: CLINIC | Age: 79
End: 2024-01-12
Payer: MEDICARE

## 2024-01-12 ENCOUNTER — ANTICOAGULATION - WARFARIN VISIT (OUTPATIENT)
Dept: PHARMACY | Facility: CLINIC | Age: 79
End: 2024-01-12
Payer: MEDICARE

## 2024-01-12 DIAGNOSIS — E11.9 TYPE 2 DIABETES MELLITUS WITHOUT COMPLICATION, WITH LONG-TERM CURRENT USE OF INSULIN (MULTI): ICD-10-CM

## 2024-01-12 DIAGNOSIS — I48.0 PAROXYSMAL ATRIAL FIBRILLATION (MULTI): Primary | Chronic | ICD-10-CM

## 2024-01-12 DIAGNOSIS — I48.91 ATRIAL FIBRILLATION, UNSPECIFIED TYPE (MULTI): ICD-10-CM

## 2024-01-12 DIAGNOSIS — Z79.4 TYPE 2 DIABETES MELLITUS WITHOUT COMPLICATION, WITH LONG-TERM CURRENT USE OF INSULIN (MULTI): ICD-10-CM

## 2024-01-12 DIAGNOSIS — I48.0 PAROXYSMAL ATRIAL FIBRILLATION (MULTI): Primary | ICD-10-CM

## 2024-01-12 LAB
POC INR: 3
POC PROTHROMBIN TIME: NORMAL

## 2024-01-12 PROCEDURE — 85610 PROTHROMBIN TIME: CPT | Mod: QW | Performed by: PHARMACIST

## 2024-01-12 NOTE — PROGRESS NOTES
Iker Peralta is a 78 y.o. male with history of atrial fibrillation who presents today for anticoagulation monitoring and adjustment.  INR 3 is therapeutic for this patient (goal range 2-3) and is reflective of 13 mg TWD  Patient verifies current dosing regimen, patient able to verbally recall dose  Patient reports 0 missed doses since last INR  Last INR 2.4 on 12/4/23 (5 week interval)  Patient denies s/sx clotting and/or stroke  Patient denies hematuria, epistaxis, rectal bleeding  Patient denies changes in diet, alcohol, or tobacco use  Vegetable intake consistent from week to week  Reviewed medication list and drug allergies with patient, updated any medication additions or modifications accordingly  Acetaminophen intake: no changes   Patient also denies any pending medical or dental procedures scheduled at this time  Patient was instructed to continue with current therapy of warfarin 13mg and RTC 4 weeks    Vandana Carreno, PharmD, BCPS   1/12/2024 11:15 AM

## 2024-01-15 RX ORDER — TORSEMIDE 10 MG/1
10 TABLET ORAL DAILY
Qty: 30 TABLET | Refills: 2 | Status: SHIPPED | OUTPATIENT
Start: 2024-01-15 | End: 2024-04-25

## 2024-01-22 DIAGNOSIS — Z79.4 TYPE 2 DIABETES MELLITUS WITHOUT COMPLICATION, WITH LONG-TERM CURRENT USE OF INSULIN (MULTI): ICD-10-CM

## 2024-01-22 DIAGNOSIS — E11.9 TYPE 2 DIABETES MELLITUS WITHOUT COMPLICATION, WITH LONG-TERM CURRENT USE OF INSULIN (MULTI): ICD-10-CM

## 2024-01-22 RX ORDER — METOPROLOL TARTRATE 50 MG/1
50 TABLET ORAL 2 TIMES DAILY
Qty: 60 TABLET | Refills: 0 | Status: SHIPPED | OUTPATIENT
Start: 2024-01-22 | End: 2024-02-26 | Stop reason: SDUPTHER

## 2024-01-23 DIAGNOSIS — Z95.2 HISTORY OF HEART VALVE REPLACEMENT: Primary | ICD-10-CM

## 2024-02-06 DIAGNOSIS — Z95.2 S/P AVR (AORTIC VALVE REPLACEMENT): Primary | Chronic | ICD-10-CM

## 2024-02-09 ENCOUNTER — ANTICOAGULATION - WARFARIN VISIT (OUTPATIENT)
Dept: PHARMACY | Facility: CLINIC | Age: 79
End: 2024-02-09
Payer: MEDICARE

## 2024-02-09 ENCOUNTER — CLINICAL SUPPORT (OUTPATIENT)
Dept: PHARMACY | Facility: CLINIC | Age: 79
End: 2024-02-09
Payer: MEDICARE

## 2024-02-09 DIAGNOSIS — Z95.2 S/P AVR (AORTIC VALVE REPLACEMENT): Chronic | ICD-10-CM

## 2024-02-09 DIAGNOSIS — I48.91 ATRIAL FIBRILLATION, UNSPECIFIED TYPE (MULTI): Primary | ICD-10-CM

## 2024-02-09 DIAGNOSIS — I48.0 PAROXYSMAL ATRIAL FIBRILLATION (MULTI): Primary | Chronic | ICD-10-CM

## 2024-02-09 LAB
POC INR: 2.8
POC PROTHROMBIN TIME: NORMAL

## 2024-02-09 PROCEDURE — 99212 OFFICE O/P EST SF 10 MIN: CPT | Performed by: INTERNAL MEDICINE

## 2024-02-09 PROCEDURE — 85610 PROTHROMBIN TIME: CPT | Mod: QW | Performed by: INTERNAL MEDICINE

## 2024-02-09 NOTE — PROGRESS NOTES
Coumadin Clinic Visit Note    Patient presents today for anticoagulation monitoring and adjustment.  Patient verified warfarin dosing schedule  Patient denies missing any doses  Patient denies unusual bruising or bleeding  Patient denies changes to medications, alcohol or tobacco use.  Consistent dietary green intake  There are no anticipated procedures at this time  INR Therapeutic today at 2.8  No changes to warfarin dose today  Next appointment 4 weeks      Oriana Ortiz, PharmD

## 2024-02-26 DIAGNOSIS — Z79.4 TYPE 2 DIABETES MELLITUS WITHOUT COMPLICATION, WITH LONG-TERM CURRENT USE OF INSULIN (MULTI): ICD-10-CM

## 2024-02-26 DIAGNOSIS — E11.9 TYPE 2 DIABETES MELLITUS WITHOUT COMPLICATION, WITH LONG-TERM CURRENT USE OF INSULIN (MULTI): ICD-10-CM

## 2024-02-26 RX ORDER — METOPROLOL TARTRATE 50 MG/1
50 TABLET ORAL 2 TIMES DAILY
Qty: 60 TABLET | Refills: 2 | Status: SHIPPED | OUTPATIENT
Start: 2024-02-26 | End: 2024-04-09 | Stop reason: SDUPTHER

## 2024-03-08 ENCOUNTER — APPOINTMENT (OUTPATIENT)
Dept: PHARMACY | Facility: CLINIC | Age: 79
End: 2024-03-08
Payer: MEDICARE

## 2024-03-13 ENCOUNTER — ANTICOAGULATION - WARFARIN VISIT (OUTPATIENT)
Dept: PHARMACY | Facility: CLINIC | Age: 79
End: 2024-03-13
Payer: MEDICARE

## 2024-03-13 DIAGNOSIS — I48.0 PAROXYSMAL ATRIAL FIBRILLATION (MULTI): Primary | Chronic | ICD-10-CM

## 2024-03-13 DIAGNOSIS — Z95.2 S/P AVR (AORTIC VALVE REPLACEMENT): Chronic | ICD-10-CM

## 2024-03-13 LAB
POC INR: 2.7
POC PROTHROMBIN TIME: NORMAL

## 2024-03-13 PROCEDURE — 85610 PROTHROMBIN TIME: CPT | Mod: QW | Performed by: PHARMACIST

## 2024-03-13 PROCEDURE — 99212 OFFICE O/P EST SF 10 MIN: CPT | Performed by: PHARMACIST

## 2024-03-13 NOTE — PROGRESS NOTES
Iker Peralta is a 79 y.o. male with history of atrial fibrillation and s/p aortic valve replacement who presents today for anticoagulation monitoring and adjustment.  INR 2.7 is therapeutic for this patient (goal range 2-3) and is reflective of 13 mg TWD  Patient verifies current dosing regimen, patient able to verbally recall dose  Patient reports 0 missed doses since last INR  Last INR 2.8 on 2/9/24 (5 week interval)  Patient denies s/sx clotting and/or stroke  Patient denies hematuria, epistaxis, rectal bleeding  Patient denies changes in diet, alcohol, or tobacco use  Vegetable intake consistent from week to week  Reviewed medication list and drug allergies with patient, updated any medication additions or modifications accordingly  Acetaminophen intake: no changes   Patient also denies any pending medical or dental procedures scheduled at this time  Patient was instructed to continue with current therapy of warfarin 13mg TWD and RTC 5 weeks    Vandana Carreno, RonitD, BCPS   3/13/2024 1:12 PM

## 2024-03-28 ENCOUNTER — TELEPHONE (OUTPATIENT)
Dept: CARDIOLOGY | Facility: CLINIC | Age: 79
End: 2024-03-28
Payer: MEDICARE

## 2024-03-28 NOTE — TELEPHONE ENCOUNTER
Rec'd notification that patient will be undergoing CYSTO under general anesthesia. Requesting cardiac clearance and ok to hold ASA and warfarin for 5 days prior.

## 2024-03-29 NOTE — TELEPHONE ENCOUNTER
Spoke with patient and instructions provided. Understanding verbalized. Patient no longer taking Aspirin. Clearance faxed successfully.

## 2024-04-09 ENCOUNTER — OFFICE VISIT (OUTPATIENT)
Dept: PRIMARY CARE | Facility: CLINIC | Age: 79
End: 2024-04-09
Payer: MEDICARE

## 2024-04-09 VITALS
WEIGHT: 250 LBS | HEART RATE: 66 BPM | TEMPERATURE: 97.2 F | DIASTOLIC BLOOD PRESSURE: 72 MMHG | BODY MASS INDEX: 40.35 KG/M2 | SYSTOLIC BLOOD PRESSURE: 134 MMHG | OXYGEN SATURATION: 95 %

## 2024-04-09 DIAGNOSIS — Z00.00 ANNUAL PHYSICAL EXAM: ICD-10-CM

## 2024-04-09 DIAGNOSIS — I10 HYPERTENSION, UNSPECIFIED TYPE: ICD-10-CM

## 2024-04-09 DIAGNOSIS — Z71.89 ADVANCE DIRECTIVE DISCUSSED WITH PATIENT: ICD-10-CM

## 2024-04-09 DIAGNOSIS — Z13.31 DEPRESSION SCREEN: ICD-10-CM

## 2024-04-09 DIAGNOSIS — C61 PROSTATE CANCER (MULTI): ICD-10-CM

## 2024-04-09 DIAGNOSIS — E66.01 MORBID OBESITY WITH BMI OF 40.0-44.9, ADULT (MULTI): ICD-10-CM

## 2024-04-09 DIAGNOSIS — E78.00 HYPERCHOLESTEROLEMIA: ICD-10-CM

## 2024-04-09 DIAGNOSIS — E11.9 TYPE 2 DIABETES MELLITUS WITHOUT COMPLICATION, WITH LONG-TERM CURRENT USE OF INSULIN (MULTI): ICD-10-CM

## 2024-04-09 DIAGNOSIS — Z00.00 MEDICARE ANNUAL WELLNESS VISIT, SUBSEQUENT: Primary | ICD-10-CM

## 2024-04-09 DIAGNOSIS — I48.0 PAROXYSMAL ATRIAL FIBRILLATION (MULTI): Chronic | ICD-10-CM

## 2024-04-09 DIAGNOSIS — Z95.2 HISTORY OF HEART VALVE REPLACEMENT: ICD-10-CM

## 2024-04-09 DIAGNOSIS — E11.9 TYPE 2 DIABETES MELLITUS WITHOUT COMPLICATION, WITHOUT LONG-TERM CURRENT USE OF INSULIN (MULTI): ICD-10-CM

## 2024-04-09 DIAGNOSIS — S06.5XAA SUBDURAL HEMATOMA (MULTI): ICD-10-CM

## 2024-04-09 DIAGNOSIS — Z13.39 ALCOHOL SCREENING: ICD-10-CM

## 2024-04-09 DIAGNOSIS — Z79.4 TYPE 2 DIABETES MELLITUS WITHOUT COMPLICATION, WITH LONG-TERM CURRENT USE OF INSULIN (MULTI): ICD-10-CM

## 2024-04-09 DIAGNOSIS — N40.0 BENIGN PROSTATIC HYPERPLASIA, UNSPECIFIED WHETHER LOWER URINARY TRACT SYMPTOMS PRESENT: ICD-10-CM

## 2024-04-09 PROCEDURE — 1170F FXNL STATUS ASSESSED: CPT | Performed by: INTERNAL MEDICINE

## 2024-04-09 PROCEDURE — 99497 ADVNCD CARE PLAN 30 MIN: CPT | Performed by: INTERNAL MEDICINE

## 2024-04-09 PROCEDURE — G0442 ANNUAL ALCOHOL SCREEN 15 MIN: HCPCS | Performed by: INTERNAL MEDICINE

## 2024-04-09 PROCEDURE — 1157F ADVNC CARE PLAN IN RCRD: CPT | Performed by: INTERNAL MEDICINE

## 2024-04-09 PROCEDURE — 1159F MED LIST DOCD IN RCRD: CPT | Performed by: INTERNAL MEDICINE

## 2024-04-09 PROCEDURE — G0444 DEPRESSION SCREEN ANNUAL: HCPCS | Performed by: INTERNAL MEDICINE

## 2024-04-09 PROCEDURE — 99213 OFFICE O/P EST LOW 20 MIN: CPT | Performed by: INTERNAL MEDICINE

## 2024-04-09 PROCEDURE — 99397 PER PM REEVAL EST PAT 65+ YR: CPT | Performed by: INTERNAL MEDICINE

## 2024-04-09 PROCEDURE — 3075F SYST BP GE 130 - 139MM HG: CPT | Performed by: INTERNAL MEDICINE

## 2024-04-09 PROCEDURE — 1036F TOBACCO NON-USER: CPT | Performed by: INTERNAL MEDICINE

## 2024-04-09 PROCEDURE — 3078F DIAST BP <80 MM HG: CPT | Performed by: INTERNAL MEDICINE

## 2024-04-09 PROCEDURE — G0439 PPPS, SUBSEQ VISIT: HCPCS | Performed by: INTERNAL MEDICINE

## 2024-04-09 RX ORDER — METOPROLOL TARTRATE 50 MG/1
50 TABLET ORAL 2 TIMES DAILY
Qty: 90 TABLET | Refills: 3 | Status: SHIPPED | OUTPATIENT
Start: 2024-04-09

## 2024-04-09 ASSESSMENT — PATIENT HEALTH QUESTIONNAIRE - PHQ9
SUM OF ALL RESPONSES TO PHQ9 QUESTIONS 1 AND 2: 0
2. FEELING DOWN, DEPRESSED OR HOPELESS: NOT AT ALL
1. LITTLE INTEREST OR PLEASURE IN DOING THINGS: NOT AT ALL

## 2024-04-09 ASSESSMENT — ACTIVITIES OF DAILY LIVING (ADL)
TAKING_MEDICATION: INDEPENDENT
BATHING: INDEPENDENT
DRESSING: INDEPENDENT
GROCERY_SHOPPING: INDEPENDENT
MANAGING_FINANCES: INDEPENDENT
DOING_HOUSEWORK: INDEPENDENT

## 2024-04-09 NOTE — PATIENT INSTRUCTIONS
Plan  Medicare wellness done .   Annual physical exam done . Tests ordered  Discussed about obesity and complications related to it. Discussed about weight reduction and regular exercise to decrease weight. Questions related to it answered to patient's satisfaction.  Blood tests ordered  Current medications are effective. advised to continue current medications.   F/U 6 months and in 12 months for [facility] wellness

## 2024-04-09 NOTE — PROGRESS NOTES
Pt is here for medicare wellness , physical and follow up     Patient denies any shortness of breath, PND, orthopnea, chest pain , palpitation, syncope or edema in legs  patient denies any abdominal pain, tenderness, nausea, vomiting, change in bowel habits or blood in stool.  Patient denies any weakness in extremities.. Denies any headache, visual symptoms , speech problems or  tremors . No TIA or stroke like symptoms..    Taking meds ok   Accuchecks around 120-150 range     During Medicare wellness apart from looking at assessment done by nurse,  I also asked following :    Alcohol use : Alcohol screening  was  done during this visit. Patient is not having any issue with increase alcohol use . No ETOH abuse was observed by history . No drinking since heart surgery     Non smoker     HIV test: patient was not found to be high risk for HIV     Cognitive issue : None     Advanced Directive: Patient has advanced directive including living will and Health care power of . Health POA is wife.  . All questions related to it answered. Total time > 16 min.     I have reviewed all active medications patient is currently on . Questions related to medication answered to patient's satisfaction.    During Medicare wellness apart from looking at assessment done by nurse,  I also asked following :    Alcohol use : Alcohol screening  was  done during this visit. Patient is not having any issue with increase alcohol use . No ETOH abuse was observed by history .    HIV test: patient was not found to be high risk for HIV     Cognitive issue : None     Advanced Directive: Patient has advanced directive including living will and Health care power of . Health POA is wife  . All questions related to it answered. Total time > 16 min.     I have reviewed all active medications patient is currently on . Questions related to medication answered to patient's satisfaction.    Over the past 2 weeks, how often have you been  bothered by any of the following problems?  Little interest or pleasure in doing things: Not at all  Feeling down, depressed, or hopeless: Not at all  Functional Ability/Level of Safety  Cognitive Impairment Observed: No cognitive impairment observed  Cognitive Impairment Reported: No cognitive impairment reported by patient or family  Nutrition and Exercise  Current Diet: Well Balanced Diet  Adequate Fluid Intake: Yes  Caffeine: Yes  Exercise Frequency: Regularly  IADL's  Grocery Shopping: Independent  Doing Housework: Independent  Taking Medication: Independent  Managing Finances: Independent  [place] Home Safety Risk Factors  Home Safety Risk Factors: None     REVIEW OF SYSTEMS:   All other systems have been reviewed and are negative in relation to patient's complaint and other than what is mentioned in History of present illness.      OBJECTIVE :    /72   Pulse 66   Temp 36.2 °C (97.2 °F)   Wt 113 kg (250 lb)   SpO2 95%   BMI 40.35 kg/m²   Vitals noted, morbidly obese  Not in acute distress  Conj Pink, No icterus  ears exam normal  Neck:No Cervical LN enlargement, No Thyroid enlargement No carotid bruit  Lungs: good air entry bilaterally, no rales or rhonchi  CVS: S1 S2 + , no S3. No loud heart murmur heard.   Abdomen: Soft, non tender , BS +. no organomegaly , no CVA tenderness  MSK: No spine tenderness or muscle tenderness noted on gross examination  CNS: Pt is alert, moving all 4 extremities. no motor weakness or abnormal movements noted on gross examination.  Extremities: No edema, No calf tenderness, Matthias's sign negative. DTR +/-  knee and wrists, Rhomberg's neg    1. Medicare annual wellness visit, subsequent  Done.      2. Annual physical exam  Done. Tests ordered      3. Advance directive discussed with patient  discussed      4. Alcohol screening        5. Depression screen        6. Hypertension, unspecified type  controlled  With med. Continue it      7. Hypercholesterolemia -hx of.  CBC and  Auto Differential    Basic Metabolic Panel    Hepatic Function Panel    Lipid Panel Non-Fasting    Thyroid Stimulating Hormone      8. Morbid obesity with BMI of 40.0-44.9, adult (CMS/[facility])  Wt reduction       9. Benign prostatic hyperplasia, unspecified whether lower urinary tract symptoms present  Hx of.       10. History of heart valve replacement  In 2009. Doing ok       11. Type 2 diabetes mellitus without complication, without long-term current use of insulin (CMS/[facility])  CBC and Auto Differential    Basic Metabolic Panel    Hepatic Function Panel    Hemoglobin A1C    Lipid Panel Non-Fasting    Thyroid Stimulating Hormone      12. Paroxysmal atrial fibrillation (CMS/[facility])  Hx of. On coumadin      13. Prostate cancer (CMS/[facility])  Sees urology       14. Type 2 diabetes mellitus without complication, with long-term current use of insulin (CMS/[facility])  metoprolol tartrate (Lopressor) 50 mg tablet      15. Subdural hematoma (CMS/[facility])  Hx of . About 3 yrs ago        Plan  Medicare wellness done .   Annual physical exam done . Tests ordered  Discussed about obesity and complications related to it. Discussed about weight reduction and regular exercise to decrease weight. Questions related to it answered to patient's satisfaction.  Blood tests ordered  Current medications are effective. advised to continue current medications.   F/U 6 months and in 12 months for medicaqre wellness

## 2024-04-11 ENCOUNTER — LAB (OUTPATIENT)
Dept: LAB | Facility: LAB | Age: 79
End: 2024-04-11
Payer: MEDICARE

## 2024-04-11 DIAGNOSIS — E11.9 TYPE 2 DIABETES MELLITUS WITHOUT COMPLICATION, WITHOUT LONG-TERM CURRENT USE OF INSULIN (MULTI): ICD-10-CM

## 2024-04-11 DIAGNOSIS — E78.00 HYPERCHOLESTEROLEMIA: ICD-10-CM

## 2024-04-11 LAB
ALBUMIN SERPL BCP-MCNC: 4 G/DL (ref 3.4–5)
ALP SERPL-CCNC: 58 U/L (ref 33–136)
ALT SERPL W P-5'-P-CCNC: 12 U/L (ref 10–52)
ANION GAP SERPL CALC-SCNC: 11 MMOL/L (ref 10–20)
AST SERPL W P-5'-P-CCNC: 15 U/L (ref 9–39)
BASOPHILS # BLD AUTO: 0.09 X10*3/UL (ref 0–0.1)
BASOPHILS NFR BLD AUTO: 1.3 %
BILIRUB DIRECT SERPL-MCNC: 0.2 MG/DL (ref 0–0.3)
BILIRUB SERPL-MCNC: 0.9 MG/DL (ref 0–1.2)
BUN SERPL-MCNC: 23 MG/DL (ref 6–23)
CALCIUM SERPL-MCNC: 9.7 MG/DL (ref 8.6–10.3)
CHLORIDE SERPL-SCNC: 104 MMOL/L (ref 98–107)
CHOLEST SERPL-MCNC: 119 MG/DL (ref 0–199)
CHOLESTEROL/HDL RATIO: 2.8
CO2 SERPL-SCNC: 26 MMOL/L (ref 21–32)
CREAT SERPL-MCNC: 1.31 MG/DL (ref 0.5–1.3)
EGFRCR SERPLBLD CKD-EPI 2021: 56 ML/MIN/1.73M*2
EOSINOPHIL # BLD AUTO: 0.54 X10*3/UL (ref 0–0.4)
EOSINOPHIL NFR BLD AUTO: 7.8 %
ERYTHROCYTE [DISTWIDTH] IN BLOOD BY AUTOMATED COUNT: 13.5 % (ref 11.5–14.5)
GLUCOSE SERPL-MCNC: 204 MG/DL (ref 74–99)
HCT VFR BLD AUTO: 36 % (ref 41–52)
HDLC SERPL-MCNC: 41.8 MG/DL
HGB BLD-MCNC: 11.7 G/DL (ref 13.5–17.5)
IMM GRANULOCYTES # BLD AUTO: 0.03 X10*3/UL (ref 0–0.5)
IMM GRANULOCYTES NFR BLD AUTO: 0.4 % (ref 0–0.9)
LYMPHOCYTES # BLD AUTO: 1.48 X10*3/UL (ref 0.8–3)
LYMPHOCYTES NFR BLD AUTO: 21.5 %
MCH RBC QN AUTO: 30.7 PG (ref 26–34)
MCHC RBC AUTO-ENTMCNC: 32.5 G/DL (ref 32–36)
MCV RBC AUTO: 95 FL (ref 80–100)
MONOCYTES # BLD AUTO: 0.66 X10*3/UL (ref 0.05–0.8)
MONOCYTES NFR BLD AUTO: 9.6 %
NEUTROPHILS # BLD AUTO: 4.09 X10*3/UL (ref 1.6–5.5)
NEUTROPHILS NFR BLD AUTO: 59.4 %
NON-HDL CHOLESTEROL: 77 MG/DL (ref 0–149)
NRBC BLD-RTO: 0 /100 WBCS (ref 0–0)
PLATELET # BLD AUTO: 212 X10*3/UL (ref 150–450)
POTASSIUM SERPL-SCNC: 4.4 MMOL/L (ref 3.5–5.3)
PROT SERPL-MCNC: 6.7 G/DL (ref 6.4–8.2)
RBC # BLD AUTO: 3.81 X10*6/UL (ref 4.5–5.9)
SODIUM SERPL-SCNC: 137 MMOL/L (ref 136–145)
TSH SERPL-ACNC: 1.74 MIU/L (ref 0.44–3.98)
WBC # BLD AUTO: 6.9 X10*3/UL (ref 4.4–11.3)

## 2024-04-11 PROCEDURE — 82248 BILIRUBIN DIRECT: CPT

## 2024-04-11 PROCEDURE — 83036 HEMOGLOBIN GLYCOSYLATED A1C: CPT

## 2024-04-11 PROCEDURE — 83718 ASSAY OF LIPOPROTEIN: CPT

## 2024-04-11 PROCEDURE — 84443 ASSAY THYROID STIM HORMONE: CPT

## 2024-04-11 PROCEDURE — 36415 COLL VENOUS BLD VENIPUNCTURE: CPT

## 2024-04-11 PROCEDURE — 85025 COMPLETE CBC W/AUTO DIFF WBC: CPT

## 2024-04-11 PROCEDURE — 80053 COMPREHEN METABOLIC PANEL: CPT

## 2024-04-11 PROCEDURE — 82465 ASSAY BLD/SERUM CHOLESTEROL: CPT

## 2024-04-12 LAB
EST. AVERAGE GLUCOSE BLD GHB EST-MCNC: 194 MG/DL
HBA1C MFR BLD: 8.4 %

## 2024-04-19 ENCOUNTER — ANTICOAGULATION - WARFARIN VISIT (OUTPATIENT)
Dept: PHARMACY | Facility: CLINIC | Age: 79
End: 2024-04-19
Payer: MEDICARE

## 2024-04-19 DIAGNOSIS — I48.0 PAROXYSMAL ATRIAL FIBRILLATION (MULTI): Primary | Chronic | ICD-10-CM

## 2024-04-19 DIAGNOSIS — Z95.2 S/P AVR (AORTIC VALVE REPLACEMENT): Chronic | ICD-10-CM

## 2024-04-19 LAB
POC INR: 3
POC PROTHROMBIN TIME: NORMAL

## 2024-04-19 PROCEDURE — 99212 OFFICE O/P EST SF 10 MIN: CPT | Performed by: PHARMACIST

## 2024-04-19 PROCEDURE — 85610 PROTHROMBIN TIME: CPT | Performed by: PHARMACIST

## 2024-04-19 PROCEDURE — 85610 PROTHROMBIN TIME: CPT | Mod: QW | Performed by: PHARMACIST

## 2024-04-19 NOTE — PROGRESS NOTES
Verified current dose with pt.    No new meds or med changes since last visit.  Pt denies unusual bleed/bruise.  Pt is having a bladder scope with Dr. Matthews on Wed April 24th.  He will be stopping warfarin today for 5 days.  Dr. Coto is aware and ok to hold 5 days.  Pt will re-start warfarin the day after.    Inr = 3  Continue same dose and check again in 1 week after he re-starts.

## 2024-04-25 DIAGNOSIS — Z79.4 TYPE 2 DIABETES MELLITUS WITHOUT COMPLICATION, WITH LONG-TERM CURRENT USE OF INSULIN (MULTI): ICD-10-CM

## 2024-04-25 DIAGNOSIS — E11.9 TYPE 2 DIABETES MELLITUS WITHOUT COMPLICATION, WITH LONG-TERM CURRENT USE OF INSULIN (MULTI): ICD-10-CM

## 2024-04-25 RX ORDER — TORSEMIDE 10 MG/1
10 TABLET ORAL DAILY
Qty: 30 TABLET | Refills: 0 | Status: SHIPPED | OUTPATIENT
Start: 2024-04-25 | End: 2024-05-13

## 2024-04-25 NOTE — TELEPHONE ENCOUNTER
Patient wife called that patient needs refill for Torsemide 10mg.  Wife would like a call once refilled  Luanne [phone]

## 2024-05-02 ENCOUNTER — APPOINTMENT (OUTPATIENT)
Dept: PHARMACY | Facility: CLINIC | Age: 79
End: 2024-05-02
Payer: MEDICARE

## 2024-05-06 ENCOUNTER — ANTICOAGULATION - WARFARIN VISIT (OUTPATIENT)
Dept: PHARMACY | Facility: CLINIC | Age: 79
End: 2024-05-06
Payer: MEDICARE

## 2024-05-06 DIAGNOSIS — I48.0 PAROXYSMAL ATRIAL FIBRILLATION (MULTI): Primary | Chronic | ICD-10-CM

## 2024-05-06 DIAGNOSIS — Z95.2 S/P AVR (AORTIC VALVE REPLACEMENT): Chronic | ICD-10-CM

## 2024-05-06 LAB
POC INR: 2.2
POC PROTHROMBIN TIME: NORMAL

## 2024-05-06 PROCEDURE — 85610 PROTHROMBIN TIME: CPT | Mod: QW | Performed by: PHARMACIST

## 2024-05-06 PROCEDURE — 99212 OFFICE O/P EST SF 10 MIN: CPT | Performed by: PHARMACIST

## 2024-05-06 NOTE — PROGRESS NOTES
Pt had procedure with Dr. Matthews on 4/24.  He held warfarin 5 days prior and he was not able to re-start until 2 days after the procedure d/t bleeding.  Verified current dose with pt.    No new meds or med changes since last visit.  Pt denies unusual bleed/bruise.  No upcoming procedures.  Inr = 2.2  Continue same dose and check again in 5 weeks.

## 2024-05-12 DIAGNOSIS — Z79.4 TYPE 2 DIABETES MELLITUS WITHOUT COMPLICATION, WITH LONG-TERM CURRENT USE OF INSULIN (MULTI): ICD-10-CM

## 2024-05-12 DIAGNOSIS — E11.9 TYPE 2 DIABETES MELLITUS WITHOUT COMPLICATION, WITH LONG-TERM CURRENT USE OF INSULIN (MULTI): ICD-10-CM

## 2024-05-13 RX ORDER — TORSEMIDE 10 MG/1
10 TABLET ORAL DAILY
Qty: 30 TABLET | Refills: 0 | Status: SHIPPED | OUTPATIENT
Start: 2024-05-13 | End: 2024-05-15 | Stop reason: SDUPTHER

## 2024-05-15 DIAGNOSIS — E11.9 TYPE 2 DIABETES MELLITUS WITHOUT COMPLICATION, WITH LONG-TERM CURRENT USE OF INSULIN (MULTI): ICD-10-CM

## 2024-05-15 DIAGNOSIS — Z79.4 TYPE 2 DIABETES MELLITUS WITHOUT COMPLICATION, WITH LONG-TERM CURRENT USE OF INSULIN (MULTI): ICD-10-CM

## 2024-05-15 RX ORDER — TORSEMIDE 10 MG/1
10 TABLET ORAL DAILY
Qty: 30 TABLET | Refills: 0 | Status: SHIPPED | OUTPATIENT
Start: 2024-05-15

## 2024-06-10 ENCOUNTER — APPOINTMENT (OUTPATIENT)
Dept: PHARMACY | Facility: CLINIC | Age: 79
End: 2024-06-10
Payer: MEDICARE

## 2024-06-12 ENCOUNTER — ANTICOAGULATION - WARFARIN VISIT (OUTPATIENT)
Dept: PHARMACY | Facility: CLINIC | Age: 79
End: 2024-06-12
Payer: MEDICARE

## 2024-06-12 DIAGNOSIS — I48.0 PAROXYSMAL ATRIAL FIBRILLATION (MULTI): Primary | Chronic | ICD-10-CM

## 2024-06-12 DIAGNOSIS — Z95.2 S/P AVR (AORTIC VALVE REPLACEMENT): Chronic | ICD-10-CM

## 2024-06-12 LAB
POC INR: 2.5
POC PROTHROMBIN TIME: NORMAL

## 2024-06-12 PROCEDURE — 85610 PROTHROMBIN TIME: CPT | Mod: QW

## 2024-06-12 PROCEDURE — 99212 OFFICE O/P EST SF 10 MIN: CPT

## 2024-06-12 NOTE — PROGRESS NOTES
Coumadin Clinic Visit Note    Patient verified warfarin dose  No missed doses  No unusual bruising or bleeding  No changes to medications  Consistent dietary green intake  No anticipated procedures at this time  INR Therapeutic today at 2.5  No changes to warfarin dose today  Next appointment 5 weeks      Dane Rodriguez, PharmD

## 2024-06-13 ENCOUNTER — APPOINTMENT (OUTPATIENT)
Dept: PHARMACY | Facility: CLINIC | Age: 79
End: 2024-06-13
Payer: MEDICARE

## 2024-06-17 DIAGNOSIS — Z79.4 TYPE 2 DIABETES MELLITUS WITHOUT COMPLICATION, WITH LONG-TERM CURRENT USE OF INSULIN (MULTI): ICD-10-CM

## 2024-06-17 DIAGNOSIS — E11.9 TYPE 2 DIABETES MELLITUS WITHOUT COMPLICATION, WITH LONG-TERM CURRENT USE OF INSULIN (MULTI): ICD-10-CM

## 2024-06-17 RX ORDER — TORSEMIDE 10 MG/1
10 TABLET ORAL DAILY
Qty: 30 TABLET | Refills: 0 | Status: SHIPPED | OUTPATIENT
Start: 2024-06-17

## 2024-07-16 ENCOUNTER — ANTICOAGULATION - WARFARIN VISIT (OUTPATIENT)
Dept: PHARMACY | Facility: CLINIC | Age: 79
End: 2024-07-16
Payer: MEDICARE

## 2024-07-16 DIAGNOSIS — Z95.2 S/P AVR (AORTIC VALVE REPLACEMENT): Chronic | ICD-10-CM

## 2024-07-16 DIAGNOSIS — I48.0 PAROXYSMAL ATRIAL FIBRILLATION (MULTI): Primary | Chronic | ICD-10-CM

## 2024-07-16 LAB
POC INR: 2.2
POC PROTHROMBIN TIME: NORMAL

## 2024-07-16 PROCEDURE — 99212 OFFICE O/P EST SF 10 MIN: CPT

## 2024-07-16 PROCEDURE — 85610 PROTHROMBIN TIME: CPT | Mod: QW

## 2024-07-16 NOTE — PROGRESS NOTES
Coumadin Clinic Visit Note    Patient verified warfarin dose of 1 mg on Sat, 2 mg all other days of week  No missed doses  No unusual bruising or bleeding  No changes to medications  No recent illness/hospitalizations  Consistent dietary green intake  No anticipated procedures at this time  INR Therapeutic today at 2.2  No changes to warfarin dose today  Next appointment 5 weeks    Shasha Obregon, Pharm D

## 2024-07-17 ENCOUNTER — APPOINTMENT (OUTPATIENT)
Dept: PHARMACY | Facility: CLINIC | Age: 79
End: 2024-07-17
Payer: MEDICARE

## 2024-07-19 DIAGNOSIS — Z79.4 TYPE 2 DIABETES MELLITUS WITHOUT COMPLICATION, WITH LONG-TERM CURRENT USE OF INSULIN (MULTI): ICD-10-CM

## 2024-07-19 DIAGNOSIS — E11.9 TYPE 2 DIABETES MELLITUS WITHOUT COMPLICATION, WITH LONG-TERM CURRENT USE OF INSULIN (MULTI): ICD-10-CM

## 2024-07-19 RX ORDER — TORSEMIDE 10 MG/1
10 TABLET ORAL DAILY
Qty: 30 TABLET | Refills: 0 | Status: SHIPPED | OUTPATIENT
Start: 2024-07-19

## 2024-08-05 ENCOUNTER — HOSPITAL ENCOUNTER (EMERGENCY)
Facility: HOSPITAL | Age: 79
Discharge: HOME | End: 2024-08-05
Attending: EMERGENCY MEDICINE
Payer: MEDICARE

## 2024-08-05 VITALS
SYSTOLIC BLOOD PRESSURE: 167 MMHG | HEART RATE: 88 BPM | RESPIRATION RATE: 16 BRPM | OXYGEN SATURATION: 98 % | WEIGHT: 250 LBS | TEMPERATURE: 96.6 F | BODY MASS INDEX: 40.18 KG/M2 | DIASTOLIC BLOOD PRESSURE: 78 MMHG | HEIGHT: 66 IN

## 2024-08-05 DIAGNOSIS — M54.41 ACUTE RIGHT-SIDED LOW BACK PAIN WITH RIGHT-SIDED SCIATICA: Primary | ICD-10-CM

## 2024-08-05 PROCEDURE — 99283 EMERGENCY DEPT VISIT LOW MDM: CPT

## 2024-08-05 PROCEDURE — 2500000005 HC RX 250 GENERAL PHARMACY W/O HCPCS

## 2024-08-05 PROCEDURE — 2500000001 HC RX 250 WO HCPCS SELF ADMINISTERED DRUGS (ALT 637 FOR MEDICARE OP): Performed by: EMERGENCY MEDICINE

## 2024-08-05 RX ORDER — METHOCARBAMOL 500 MG/1
500 TABLET, FILM COATED ORAL 2 TIMES DAILY
Qty: 20 TABLET | Refills: 0 | Status: SHIPPED | OUTPATIENT
Start: 2024-08-05 | End: 2024-08-15

## 2024-08-05 RX ORDER — ACETAMINOPHEN 325 MG/1
975 TABLET ORAL ONCE
Status: COMPLETED | OUTPATIENT
Start: 2024-08-05 | End: 2024-08-05

## 2024-08-05 RX ORDER — METHOCARBAMOL 100 MG/ML
1000 INJECTION, SOLUTION INTRAMUSCULAR; INTRAVENOUS ONCE
Status: DISCONTINUED | OUTPATIENT
Start: 2024-08-05 | End: 2024-08-05

## 2024-08-05 RX ORDER — METHOCARBAMOL 500 MG/1
1000 TABLET, FILM COATED ORAL ONCE
Status: COMPLETED | OUTPATIENT
Start: 2024-08-05 | End: 2024-08-05

## 2024-08-05 RX ORDER — LIDOCAINE 50 MG/G
1 PATCH TOPICAL DAILY
Qty: 10 PATCH | Refills: 0 | Status: SHIPPED | OUTPATIENT
Start: 2024-08-05

## 2024-08-05 RX ORDER — LIDOCAINE 560 MG/1
1 PATCH PERCUTANEOUS; TOPICAL; TRANSDERMAL DAILY
Status: DISCONTINUED | OUTPATIENT
Start: 2024-08-05 | End: 2024-08-05 | Stop reason: HOSPADM

## 2024-08-05 RX ORDER — KETOROLAC TROMETHAMINE 30 MG/ML
15 INJECTION, SOLUTION INTRAMUSCULAR; INTRAVENOUS ONCE
Status: DISCONTINUED | OUTPATIENT
Start: 2024-08-05 | End: 2024-08-05

## 2024-08-05 ASSESSMENT — COLUMBIA-SUICIDE SEVERITY RATING SCALE - C-SSRS
2. HAVE YOU ACTUALLY HAD ANY THOUGHTS OF KILLING YOURSELF?: NO
1. IN THE PAST MONTH, HAVE YOU WISHED YOU WERE DEAD OR WISHED YOU COULD GO TO SLEEP AND NOT WAKE UP?: NO
6. HAVE YOU EVER DONE ANYTHING, STARTED TO DO ANYTHING, OR PREPARED TO DO ANYTHING TO END YOUR LIFE?: NO

## 2024-08-05 ASSESSMENT — PAIN - FUNCTIONAL ASSESSMENT: PAIN_FUNCTIONAL_ASSESSMENT: 0-10

## 2024-08-05 ASSESSMENT — PAIN DESCRIPTION - DESCRIPTORS: DESCRIPTORS: SHARP

## 2024-08-05 ASSESSMENT — PAIN DESCRIPTION - LOCATION: LOCATION: BACK

## 2024-08-05 ASSESSMENT — PAIN DESCRIPTION - ORIENTATION: ORIENTATION: RIGHT;LOWER

## 2024-08-05 ASSESSMENT — PAIN DESCRIPTION - PAIN TYPE: TYPE: ACUTE PAIN

## 2024-08-05 ASSESSMENT — PAIN SCALES - GENERAL: PAINLEVEL_OUTOF10: 10 - WORST POSSIBLE PAIN

## 2024-08-05 NOTE — ED PROVIDER NOTES
HPI   Chief Complaint   Patient presents with    Back Pain     PT. C/O PAIN TO RIGHT LOWER BACK FOR PAST 3-4 DAYS, STATES RADIATES TO LATERAL THIGH. DENIES RECENT FALLS. PT. STATES MOVED IN THE PAST COUPLE WEEKS, HAS BEEN LIFTING BOXES BUT NOTHING HEAVY. DENIES NUMBNESS/TINGLING TO LEG.       HPI  Patient is a 79-year-old male with past medical history of diabetes mellitus, BPH, HLD, HTN who presents emergency department today for concerns of back pain.  Patient states that he has had pain in his low back for the past 3 days with radiating pain down the side of his leg stopping just for the knee.  He states that he recently moved into a new place and had been lifting boxes for the move.  He states the pain is very sharp when he first stands up and for the first few steps afterwards, but then will dissipate.  He denies any change in urination or bowel habits, CVA tenderness, abdominal tenderness, chest pain, shortness of breath, fevers, recent illnesses.  Patient states that his pain is relieved by Tylenol between doses.    Patient History   Past Medical History:   Diagnosis Date    Coronary arteriosclerosis 04/25/2023 11/24/2009 AVR: 25 mm Keon-Ring valve - mild on cath before surgery    Dorsalgia, unspecified 08/27/2019    Back pain, acute    Encounter for immunization 02/22/2016    Need for Tdap vaccination    Horseshoe tear of retina without detachment, left eye 02/22/2016    Retinal tear, left    Hypercholesterolemia 04/25/2023    Dr. Umana    Hypertension 04/25/2023    Overweight     Overweight (BMI 25.0-29.9)    Paroxysmal atrial fibrillation (Multi) 04/25/2023    On Coumadin. Had SDH d/t fall. No falls since.    Personal history of other diseases of the circulatory system 04/23/2020    History of atrial fibrillation    Personal history of other drug therapy 10/09/2015    History of influenza vaccination    Personal history of other endocrine, nutritional and metabolic disease 05/18/2018     History of diabetes mellitus    Personal history of other infectious and parasitic diseases     History of Legionnaire's disease    S/P AVR (aortic valve replacement) 2023 AVR: 25 mm Keon-Ring valve    Subdural hematoma (Multi) 2023     Past Surgical History:   Procedure Laterality Date    AORTIC VALVE REPLACEMENT  2021    Aortic Valve Replacement    CHOLECYSTECTOMY  2014    Cholecystectomy    COLONOSCOPY  10/25/2021    Complete Colonoscopy    OTHER SURGICAL HISTORY  2021    Cardiac catheterization     Family History   Problem Relation Name Age of Onset    Other (parkinsons disease) Mother      Other (cardiac disorder) Father       Social History     Tobacco Use    Smoking status: Former     Types: Cigars     Quit date: 1989     Years since quittin.6     Passive exposure: Past    Smokeless tobacco: Never   Vaping Use    Vaping status: Never Used   Substance Use Topics    Alcohol use: Never    Drug use: Never       Physical Exam   ED Triage Vitals [24 1738]   Temperature Heart Rate Respirations BP   35.9 °C (96.6 °F) 88 16 167/78      Pulse Ox Temp Source Heart Rate Source Patient Position   98 % Temporal Monitor Sitting      BP Location FiO2 (%)     Right arm --       Physical Exam  Vitals and nursing note reviewed.   Constitutional:       General: He is not in acute distress.     Appearance: He is well-developed and overweight.   HENT:      Head: Normocephalic and atraumatic.   Eyes:      Conjunctiva/sclera: Conjunctivae normal.   Cardiovascular:      Rate and Rhythm: Normal rate and regular rhythm.      Heart sounds: No murmur heard.  Pulmonary:      Effort: Pulmonary effort is normal. No respiratory distress.      Breath sounds: Normal breath sounds.   Abdominal:      Palpations: Abdomen is soft.      Tenderness: There is no abdominal tenderness.   Musculoskeletal:         General: No swelling.      Cervical back: Neck supple.      Comments: Pain  in right lower back upon extension with radiation to lateral right thigh   Skin:     General: Skin is warm and dry.      Capillary Refill: Capillary refill takes less than 2 seconds.   Neurological:      Mental Status: He is alert.   Psychiatric:         Mood and Affect: Mood normal.         ED Course & MDM   ED Course as of 08/05/24 1826   Mon Aug 05, 2024   1822 This is a pleasant 79-year-old male, medical history significant for prior thoracic back surgery, presents to the emergency department for radicular symptoms.  He states has been doing a lot of moving boxes lately as they were moving to a new home.  When he stands, he has pain that shoots down his right buttock to his right lateral leg.  He has no weakness.  Has no red flag symptoms.  He has not had falls or trauma.  His pulses are normal.  Clinically, I do feel that this may be radiculopathy.  Patient will be treated conservatively with outpatient referral. [MK]      ED Course User Index  [MK] Pasquale Leija MD         Diagnoses as of 08/05/24 1826   Acute right-sided low back pain with right-sided sciatica          Medical Decision Making  Patient is a 79-year-old male with past medical history of diabetes mellitus, BPH, HLD, HTN who presents emergency department today for concerns of back pain.  Patient's pain is consistent with right-sided sciatica pain.  Patient had no saddle anesthesia, midline tenderness, fevers, urinary retention, changes in urination or bowel habits.  Given patient's diabetes stent, Toradol was not given.  Patient given Robaxin, Tylenol, and lidocaine patch in the emergency department.  Patient given a prescription for Robaxin and lidocaine patches with instructions for proper use.  Patient also given contact information for Dr. William, a neurologist, for follow-up as needed.  Patient discharged in good condition with strict return precautions.  Patient understood and was agreeable with the plan.    Procedure  Procedures none      Mundo Alexander, DO  Resident  08/05/24 [id number]

## 2024-08-05 NOTE — DISCHARGE INSTRUCTIONS
You were seen Emergency Department today for concerns of low back pain radiating into your leg.  Your symptoms are consistent with sciatica pain.  You were given Robaxin, Tylenol, and a lidocaine patch in the emergency department.  You were also given prescriptions for Robaxin and lidocaine patches, which should be taken as prescribed.  Contact information has been given for Dr. William, a neurologist.  Please call to make an appointment at your earliest convenience for follow-up as needed.  You are safely discharged at this time.  However, should you have any new, worsening, or concerning symptoms please report back to the emergency department.

## 2024-08-05 NOTE — ED TRIAGE NOTES
PT. C/O PAIN TO RIGHT LOWER BACK FOR PAST 3-4 DAYS, STATES RADIATES TO LATERAL THIGH. DENIES RECENT FALLS. PT. STATES MOVED IN THE PAST COUPLE WEEKS, HAS BEEN LIFTING BOXES BUT NOTHING HEAVY. DENIES NUMBNESS/TINGLING TO LEG.

## 2024-08-06 ENCOUNTER — OFFICE VISIT (OUTPATIENT)
Dept: PRIMARY CARE | Facility: CLINIC | Age: 79
End: 2024-08-06
Payer: MEDICARE

## 2024-08-06 VITALS
OXYGEN SATURATION: 97 % | SYSTOLIC BLOOD PRESSURE: 120 MMHG | TEMPERATURE: 97.3 F | HEART RATE: 83 BPM | WEIGHT: 246 LBS | BODY MASS INDEX: 39.71 KG/M2 | DIASTOLIC BLOOD PRESSURE: 82 MMHG

## 2024-08-06 DIAGNOSIS — E11.9 TYPE 2 DIABETES MELLITUS WITHOUT COMPLICATION, WITH LONG-TERM CURRENT USE OF INSULIN (MULTI): ICD-10-CM

## 2024-08-06 DIAGNOSIS — Z79.4 TYPE 2 DIABETES MELLITUS WITHOUT COMPLICATION, WITH LONG-TERM CURRENT USE OF INSULIN (MULTI): ICD-10-CM

## 2024-08-06 DIAGNOSIS — M54.41 ACUTE RIGHT-SIDED LOW BACK PAIN WITH RIGHT-SIDED SCIATICA: Primary | ICD-10-CM

## 2024-08-06 DIAGNOSIS — N18.30 STAGE 3 CHRONIC KIDNEY DISEASE, UNSPECIFIED WHETHER STAGE 3A OR 3B CKD (MULTI): ICD-10-CM

## 2024-08-06 DIAGNOSIS — C67.0 MALIGNANT NEOPLASM OF TRIGONE OF BLADDER (MULTI): ICD-10-CM

## 2024-08-06 PROCEDURE — 1157F ADVNC CARE PLAN IN RCRD: CPT | Performed by: INTERNAL MEDICINE

## 2024-08-06 PROCEDURE — 3079F DIAST BP 80-89 MM HG: CPT | Performed by: INTERNAL MEDICINE

## 2024-08-06 PROCEDURE — 1159F MED LIST DOCD IN RCRD: CPT | Performed by: INTERNAL MEDICINE

## 2024-08-06 PROCEDURE — 99214 OFFICE O/P EST MOD 30 MIN: CPT | Performed by: INTERNAL MEDICINE

## 2024-08-06 PROCEDURE — 1036F TOBACCO NON-USER: CPT | Performed by: INTERNAL MEDICINE

## 2024-08-06 PROCEDURE — 3074F SYST BP LT 130 MM HG: CPT | Performed by: INTERNAL MEDICINE

## 2024-08-06 RX ORDER — TORSEMIDE 10 MG/1
10 TABLET ORAL DAILY
Qty: 90 TABLET | Refills: 3 | Status: SHIPPED | OUTPATIENT
Start: 2024-08-06

## 2024-08-06 NOTE — PATIENT INSTRUCTIONS
Plan:  L;lidocaine cream sent to pharmacy   Continue robaxin   Patient  was advised to call back if symptoms persist after few days or worsen.   Patient agreed with plan and felt satisfied.  Follow up as advised  or as needed.

## 2024-08-06 NOTE — PROGRESS NOTES
My nurse note reviewed. Patient is here for:  Back Pain (Right leg pain)     Here with wife. Hx from both of them as well as from ER records    Pt was in ER yesterday for back pain , acute going down to lat and post part of R thigh. No fever, chills    Available ER discharge summary , was  reviewed and discussed with patient . Questions related to it answered to patient's satisfaction.  Was moving lot of boxes lately. As they moved to [facility] . Everything in one floor   Hx of bladder cancer, sees urologist , was advised to see him in a yr during his last visit with him this year    Lab Results   Component Value Date    CREATININE 1.31 (H) 04/11/2024     Hx of CKD    OBJECTIVE :  Obese  /82   Pulse 83   Temp 36.3 °C (97.3 °F)   Wt 112 kg (246 lb)   SpO2 97%   BMI 39.71 kg/m²   obese  Lower back - No spine tenderness. Forward bending and side to side movements normal.  DTR + both knees. Straight leg raising ok upto 80 degree bilaterally . No gross motor weakness noted in lower extremities. Walking on toes and heel ok. No skin lesions or vesicles noted in lower back area. DTR +/- both knees    Assessment:  1. Acute right-sided low back pain with right-sided sciatica -continue med.  benzocaine-lidocaine-tetracaine cream sent to pharmacy       2. Type 2 diabetes mellitus without complication, with long-term current use of insulin (Multi) -hx of.  torsemide (Demadex) 10 mg tablet      3. Malignant neoplasm of trigone of bladder (Multi)  Hx of. Sees urologist .       4. Stage 3 chronic kidney disease, unspecified whether stage 3a or 3b CKD (Multi)  Hx of. Reviewed           Plan:  L;lidocaine cream sent to pharmacy   Continue robaxin   ER note reviewed.   Patient  was advised to call back if symptoms persist after few days or worsen.   Patient agreed with plan and felt satisfied.  Follow up as advised  or as needed.

## 2024-08-20 ENCOUNTER — ANTICOAGULATION - WARFARIN VISIT (OUTPATIENT)
Dept: PHARMACY | Facility: CLINIC | Age: 79
End: 2024-08-20
Payer: MEDICARE

## 2024-08-20 DIAGNOSIS — Z95.2 S/P AVR (AORTIC VALVE REPLACEMENT): Chronic | ICD-10-CM

## 2024-08-20 DIAGNOSIS — I48.0 PAROXYSMAL ATRIAL FIBRILLATION (MULTI): Primary | Chronic | ICD-10-CM

## 2024-08-20 LAB
POC INR: 2.5
POC PROTHROMBIN TIME: NORMAL

## 2024-08-20 PROCEDURE — 99212 OFFICE O/P EST SF 10 MIN: CPT | Performed by: PHARMACIST

## 2024-08-20 PROCEDURE — 85610 PROTHROMBIN TIME: CPT | Mod: QW | Performed by: PHARMACIST

## 2024-08-20 NOTE — PROGRESS NOTES
Verified current dose with pt.    No new meds or med changes since last visit.  Pt denies unusual bleed/bruise.  No upcoming procedures.  Inr = 2.5  Continue same dose and check again in 5 weeks.

## 2024-09-03 ENCOUNTER — HOSPITAL ENCOUNTER (OUTPATIENT)
Dept: CARDIOLOGY | Facility: CLINIC | Age: 79
Discharge: HOME | End: 2024-09-03
Payer: MEDICARE

## 2024-09-03 VITALS
HEIGHT: 66 IN | BODY MASS INDEX: 39.53 KG/M2 | DIASTOLIC BLOOD PRESSURE: 82 MMHG | WEIGHT: 246 LBS | SYSTOLIC BLOOD PRESSURE: 120 MMHG

## 2024-09-03 DIAGNOSIS — I10 PRIMARY HYPERTENSION: Chronic | ICD-10-CM

## 2024-09-03 DIAGNOSIS — Z95.2 S/P AVR (AORTIC VALVE REPLACEMENT): Chronic | ICD-10-CM

## 2024-09-03 DIAGNOSIS — I48.91 UNSPECIFIED ATRIAL FIBRILLATION (MULTI): ICD-10-CM

## 2024-09-03 PROCEDURE — 93306 TTE W/DOPPLER COMPLETE: CPT

## 2024-09-03 PROCEDURE — 93306 TTE W/DOPPLER COMPLETE: CPT | Performed by: INTERNAL MEDICINE

## 2024-09-07 LAB
AORTIC VALVE MEAN GRADIENT: 14.7 MMHG
AORTIC VALVE PEAK VELOCITY: 2.57 M/S
AV PEAK GRADIENT: 26.3 MMHG
AVA (PEAK VEL): 1.45 CM2
AVA (VTI): 1.2 CM2
EJECTION FRACTION APICAL 4 CHAMBER: 63.7
EJECTION FRACTION: 63 %
LEFT ATRIUM VOLUME AREA LENGTH INDEX BSA: 39 ML/M2
LEFT VENTRICLE INTERNAL DIMENSION DIASTOLE: 4.63 CM (ref 3.5–6)
LEFT VENTRICULAR OUTFLOW TRACT DIAMETER: 1.97 CM
RIGHT VENTRICLE FREE WALL PEAK S': 0.4 CM/S
RIGHT VENTRICLE PEAK SYSTOLIC PRESSURE: 22.9 MMHG
TRICUSPID ANNULAR PLANE SYSTOLIC EXCURSION: 1.4 CM

## 2024-09-18 ENCOUNTER — LAB (OUTPATIENT)
Dept: LAB | Facility: LAB | Age: 79
End: 2024-09-18
Payer: MEDICARE

## 2024-09-18 ENCOUNTER — APPOINTMENT (OUTPATIENT)
Dept: PRIMARY CARE | Facility: CLINIC | Age: 79
End: 2024-09-18
Payer: MEDICARE

## 2024-09-18 VITALS
WEIGHT: 248 LBS | DIASTOLIC BLOOD PRESSURE: 82 MMHG | RESPIRATION RATE: 16 BRPM | HEIGHT: 66 IN | BODY MASS INDEX: 39.86 KG/M2 | TEMPERATURE: 98 F | OXYGEN SATURATION: 97 % | HEART RATE: 84 BPM | SYSTOLIC BLOOD PRESSURE: 130 MMHG

## 2024-09-18 DIAGNOSIS — E11.9 TYPE 2 DIABETES MELLITUS WITHOUT COMPLICATION, WITH LONG-TERM CURRENT USE OF INSULIN (MULTI): Primary | ICD-10-CM

## 2024-09-18 DIAGNOSIS — Z79.4 TYPE 2 DIABETES MELLITUS WITHOUT COMPLICATION, WITH LONG-TERM CURRENT USE OF INSULIN (MULTI): ICD-10-CM

## 2024-09-18 DIAGNOSIS — I48.91 ATRIAL FIBRILLATION, UNSPECIFIED TYPE (MULTI): ICD-10-CM

## 2024-09-18 DIAGNOSIS — E11.9 TYPE 2 DIABETES MELLITUS WITHOUT COMPLICATION, WITH LONG-TERM CURRENT USE OF INSULIN (MULTI): ICD-10-CM

## 2024-09-18 DIAGNOSIS — N40.0 BENIGN PROSTATIC HYPERPLASIA, UNSPECIFIED WHETHER LOWER URINARY TRACT SYMPTOMS PRESENT: ICD-10-CM

## 2024-09-18 DIAGNOSIS — Z79.4 TYPE 2 DIABETES MELLITUS WITHOUT COMPLICATION, WITH LONG-TERM CURRENT USE OF INSULIN (MULTI): Primary | ICD-10-CM

## 2024-09-18 PROCEDURE — 99213 OFFICE O/P EST LOW 20 MIN: CPT | Performed by: FAMILY MEDICINE

## 2024-09-18 PROCEDURE — 1157F ADVNC CARE PLAN IN RCRD: CPT | Performed by: FAMILY MEDICINE

## 2024-09-18 PROCEDURE — 3075F SYST BP GE 130 - 139MM HG: CPT | Performed by: FAMILY MEDICINE

## 2024-09-18 PROCEDURE — 1159F MED LIST DOCD IN RCRD: CPT | Performed by: FAMILY MEDICINE

## 2024-09-18 PROCEDURE — 90662 IIV NO PRSV INCREASED AG IM: CPT | Performed by: FAMILY MEDICINE

## 2024-09-18 PROCEDURE — 1036F TOBACCO NON-USER: CPT | Performed by: FAMILY MEDICINE

## 2024-09-18 PROCEDURE — 83036 HEMOGLOBIN GLYCOSYLATED A1C: CPT

## 2024-09-18 PROCEDURE — 3079F DIAST BP 80-89 MM HG: CPT | Performed by: FAMILY MEDICINE

## 2024-09-18 PROCEDURE — 36415 COLL VENOUS BLD VENIPUNCTURE: CPT

## 2024-09-18 PROCEDURE — G0008 ADMIN INFLUENZA VIRUS VAC: HCPCS | Performed by: FAMILY MEDICINE

## 2024-09-18 RX ORDER — METOPROLOL TARTRATE 50 MG/1
50 TABLET ORAL 2 TIMES DAILY
Qty: 90 TABLET | Refills: 3 | Status: SHIPPED | OUTPATIENT
Start: 2024-09-18

## 2024-09-18 RX ORDER — TORSEMIDE 10 MG/1
10 TABLET ORAL DAILY
Qty: 90 TABLET | Refills: 0 | Status: CANCELLED | OUTPATIENT
Start: 2024-09-18

## 2024-09-18 RX ORDER — METFORMIN HYDROCHLORIDE 500 MG/1
2000 TABLET, EXTENDED RELEASE ORAL NIGHTLY
Qty: 360 TABLET | Refills: 3 | Status: SHIPPED | OUTPATIENT
Start: 2024-09-18

## 2024-09-18 RX ORDER — SIMVASTATIN 40 MG/1
40 TABLET, FILM COATED ORAL DAILY
Qty: 90 TABLET | Refills: 3 | Status: SHIPPED | OUTPATIENT
Start: 2024-09-18

## 2024-09-18 RX ORDER — POTASSIUM CHLORIDE 750 MG/1
10 CAPSULE, EXTENDED RELEASE ORAL DAILY
Qty: 90 CAPSULE | Refills: 3 | Status: CANCELLED | OUTPATIENT
Start: 2024-09-18

## 2024-09-18 RX ORDER — WARFARIN 2 MG/1
2 TABLET ORAL EVERY EVENING
Qty: 100 TABLET | Refills: 2 | Status: SHIPPED | OUTPATIENT
Start: 2024-09-18 | End: 2025-09-18

## 2024-09-18 RX ORDER — TAMSULOSIN HYDROCHLORIDE 0.4 MG/1
0.4 CAPSULE ORAL DAILY
Qty: 90 CAPSULE | Refills: 3 | Status: SHIPPED | OUTPATIENT
Start: 2024-09-18

## 2024-09-18 ASSESSMENT — ENCOUNTER SYMPTOMS
GASTROINTESTINAL NEGATIVE: 1
RESPIRATORY NEGATIVE: 1
CONSTITUTIONAL NEGATIVE: 1
CARDIOVASCULAR NEGATIVE: 1
HEMATOLOGIC/LYMPHATIC NEGATIVE: 1
PSYCHIATRIC NEGATIVE: 1
DIZZINESS: 0
NUMBNESS: 0
ARTHRALGIAS: 1

## 2024-09-18 NOTE — PROGRESS NOTES
"Subjective   Patient ID: Iker Peralta is a 79 y.o. male who presents for Hypertension.    Patient is doing well.  No chest pain or shortness of breath no nausea vomiting diarrhea fever.  Patient tolerating his medications.  He continues to follow-up with specialist.  No urinary symptoms.  No falls no sores on his feet no numbness tingling.  He would like to see podiatry for his toenails.  He is having a hard time condom.  Patient is trying to stay active, he has been doing well on his medications.  No other concerns at this time.  Patient's been checking his blood pressure that been fine         Review of Systems   Constitutional: Negative.    Respiratory: Negative.     Cardiovascular: Negative.    Gastrointestinal: Negative.    Genitourinary: Negative.    Musculoskeletal:  Positive for arthralgias and gait problem.   Neurological:  Negative for dizziness and numbness.   Hematological: Negative.    Psychiatric/Behavioral: Negative.         Objective   /82 (BP Location: Right arm, Patient Position: Sitting, BP Cuff Size: Adult)   Pulse 84   Temp 36.7 °C (98 °F)   Resp 16   Ht 1.664 m (5' 5.5\")   Wt 112 kg (248 lb)   SpO2 97%   BMI 40.64 kg/m²     Physical Exam  Cardiovascular:      Rate and Rhythm: Normal rate and regular rhythm.      Pulses: Normal pulses.      Heart sounds: Normal heart sounds.   Pulmonary:      Effort: Pulmonary effort is normal.      Breath sounds: Normal breath sounds.   Skin:     General: Skin is warm.   Neurological:      Mental Status: He is alert.      Sensory: No sensory deficit.         Assessment/Plan   Problem List Items Addressed This Visit       BPH (benign prostatic hyperplasia)    Relevant Medications    tamsulosin (Flomax) 0.4 mg 24 hr capsule    Type 2 diabetes mellitus (Multi) - Primary    Relevant Medications    simvastatin (Zocor) 40 mg tablet    metoprolol tartrate (Lopressor) 50 mg tablet    metFORMIN  mg 24 hr tablet    Other Relevant Orders    " Hemoglobin A1C    Referral to Podiatry     Other Visit Diagnoses       Atrial fibrillation, unspecified type (Multi)        Relevant Medications    warfarin (Coumadin) 2 mg tablet    metoprolol tartrate (Lopressor) 50 mg tablet             Patient see podiatry will continue his medications.  He will follow-up in 2 months.  Will recheck his blood work.  Patient's been on the potassium and Lasix for some time.  We may switch this.  He will follow-up in 2 months to ensure working all screening tests and follow-up labs.  Will verify his medications and once we do we will send a minute and then recheck his blood work and 4 weeks  If any chest pain shortness of breath any nausea vomiting or fever headache any concerning symptoms go to ER

## 2024-09-19 LAB
EST. AVERAGE GLUCOSE BLD GHB EST-MCNC: 180 MG/DL
HBA1C MFR BLD: 7.9 %

## 2024-09-19 NOTE — RESULT ENCOUNTER NOTE
Mr. Peralta your hemoglobin A1c was 7.9.  This is still elevated.  Please continue to work on lowering your glucose levels.  We can recheck this in 3 months

## 2024-09-24 ENCOUNTER — ANTICOAGULATION - WARFARIN VISIT (OUTPATIENT)
Dept: PHARMACY | Facility: CLINIC | Age: 79
End: 2024-09-24
Payer: MEDICARE

## 2024-09-24 DIAGNOSIS — I48.0 PAROXYSMAL ATRIAL FIBRILLATION (MULTI): Primary | Chronic | ICD-10-CM

## 2024-09-24 DIAGNOSIS — Z95.2 S/P AVR (AORTIC VALVE REPLACEMENT): Chronic | ICD-10-CM

## 2024-09-24 LAB
POC INR: 1.2
POC PROTHROMBIN TIME: NORMAL

## 2024-09-24 PROCEDURE — 85610 PROTHROMBIN TIME: CPT | Mod: QW

## 2024-09-24 PROCEDURE — 99212 OFFICE O/P EST SF 10 MIN: CPT

## 2024-09-24 NOTE — PROGRESS NOTES
No bleeding or unusual bruising.  Medications and doses verified.  No scheduled procedures at this time.  INR=1.2   Patient missed the last 3-4 doses because he moved.  Plan: Boost dose today then continue same weekly dose.  Follow up INR check in 3 weeks.

## 2024-10-08 ENCOUNTER — APPOINTMENT (OUTPATIENT)
Dept: PRIMARY CARE | Facility: CLINIC | Age: 79
End: 2024-10-08
Payer: MEDICARE

## 2024-10-15 ENCOUNTER — ANTICOAGULATION - WARFARIN VISIT (OUTPATIENT)
Dept: PHARMACY | Facility: CLINIC | Age: 79
End: 2024-10-15
Payer: MEDICARE

## 2024-10-15 DIAGNOSIS — Z95.2 S/P AVR (AORTIC VALVE REPLACEMENT): Chronic | ICD-10-CM

## 2024-10-15 DIAGNOSIS — I48.0 PAROXYSMAL ATRIAL FIBRILLATION (MULTI): Primary | Chronic | ICD-10-CM

## 2024-10-15 LAB
POC INR: 2.4
POC PROTHROMBIN TIME: NORMAL

## 2024-10-15 PROCEDURE — 85610 PROTHROMBIN TIME: CPT | Mod: QW

## 2024-10-15 PROCEDURE — 99212 OFFICE O/P EST SF 10 MIN: CPT

## 2024-10-15 NOTE — PROGRESS NOTES
No bleeding or unusual bruising.  Medications and doses verified.  No missed doses of Warfarin.  No scheduled procedures at this time.  INR=2.4   Plan: Continue same weekly dose.  Follow up INR check in 5 weeks.

## 2024-10-29 PROBLEM — E11.9 TYPE 2 DIABETES MELLITUS: Chronic | Status: ACTIVE | Noted: 2023-04-25

## 2024-10-29 PROBLEM — H35.81 RETINAL EDEMA OF RIGHT EYE: Status: RESOLVED | Noted: 2023-08-21 | Resolved: 2024-10-29

## 2024-10-29 PROBLEM — N20.0 KIDNEY STONE: Status: RESOLVED | Noted: 2023-08-21 | Resolved: 2024-10-29

## 2024-10-29 PROBLEM — N18.30 STAGE 3 CHRONIC KIDNEY DISEASE, UNSPECIFIED WHETHER STAGE 3A OR 3B CKD (MULTI): Chronic | Status: ACTIVE | Noted: 2024-08-06

## 2024-10-29 PROBLEM — R53.1 WEAKNESS: Status: RESOLVED | Noted: 2023-08-21 | Resolved: 2024-10-29

## 2024-10-29 PROBLEM — S29.9XXA THORACIC INJURY: Status: RESOLVED | Noted: 2023-08-21 | Resolved: 2024-10-29

## 2024-10-30 ENCOUNTER — APPOINTMENT (OUTPATIENT)
Dept: CARDIOLOGY | Facility: CLINIC | Age: 79
End: 2024-10-30
Payer: MEDICARE

## 2024-10-30 VITALS
WEIGHT: 253 LBS | BODY MASS INDEX: 41.46 KG/M2 | DIASTOLIC BLOOD PRESSURE: 84 MMHG | HEART RATE: 87 BPM | OXYGEN SATURATION: 98 % | SYSTOLIC BLOOD PRESSURE: 132 MMHG

## 2024-10-30 DIAGNOSIS — I10 PRIMARY HYPERTENSION: Chronic | ICD-10-CM

## 2024-10-30 DIAGNOSIS — I35.9 AORTIC VALVE DISEASE: Primary | ICD-10-CM

## 2024-10-30 DIAGNOSIS — E66.01 SEVERE OBESITY (MULTI): ICD-10-CM

## 2024-10-30 DIAGNOSIS — I25.10 CORONARY ARTERIOSCLEROSIS: Chronic | ICD-10-CM

## 2024-10-30 DIAGNOSIS — I48.0 PAROXYSMAL ATRIAL FIBRILLATION (MULTI): Chronic | ICD-10-CM

## 2024-10-30 DIAGNOSIS — E78.00 HYPERCHOLESTEROLEMIA: Chronic | ICD-10-CM

## 2024-10-30 DIAGNOSIS — Z95.2 S/P AVR (AORTIC VALVE REPLACEMENT): Chronic | ICD-10-CM

## 2024-10-30 PROCEDURE — 1160F RVW MEDS BY RX/DR IN RCRD: CPT | Performed by: INTERNAL MEDICINE

## 2024-10-30 PROCEDURE — 1157F ADVNC CARE PLAN IN RCRD: CPT | Performed by: INTERNAL MEDICINE

## 2024-10-30 PROCEDURE — 1159F MED LIST DOCD IN RCRD: CPT | Performed by: INTERNAL MEDICINE

## 2024-10-30 PROCEDURE — 3075F SYST BP GE 130 - 139MM HG: CPT | Performed by: INTERNAL MEDICINE

## 2024-10-30 PROCEDURE — 1036F TOBACCO NON-USER: CPT | Performed by: INTERNAL MEDICINE

## 2024-10-30 PROCEDURE — 93005 ELECTROCARDIOGRAM TRACING: CPT | Performed by: INTERNAL MEDICINE

## 2024-10-30 PROCEDURE — 99214 OFFICE O/P EST MOD 30 MIN: CPT | Performed by: INTERNAL MEDICINE

## 2024-10-30 PROCEDURE — 3079F DIAST BP 80-89 MM HG: CPT | Performed by: INTERNAL MEDICINE

## 2024-10-31 LAB
Q ONSET: 209 MS
QRS COUNT: 13 BEATS
QRS DURATION: 128 MS
QT INTERVAL: 402 MS
QTC CALCULATION(BAZETT): 463 MS
QTC FREDERICIA: 442 MS
R AXIS: -43 DEGREES
T AXIS: -5 DEGREES
T OFFSET: 410 MS
VENTRICULAR RATE: 80 BPM

## 2024-11-10 DIAGNOSIS — E11.9 TYPE 2 DIABETES MELLITUS WITHOUT COMPLICATION, WITH LONG-TERM CURRENT USE OF INSULIN (MULTI): ICD-10-CM

## 2024-11-10 DIAGNOSIS — Z79.4 TYPE 2 DIABETES MELLITUS WITHOUT COMPLICATION, WITH LONG-TERM CURRENT USE OF INSULIN (MULTI): ICD-10-CM

## 2024-11-12 ENCOUNTER — OFFICE VISIT (OUTPATIENT)
Dept: PODIATRY | Facility: CLINIC | Age: 79
End: 2024-11-12
Payer: MEDICARE

## 2024-11-12 VITALS — HEIGHT: 66 IN | BODY MASS INDEX: 40.66 KG/M2 | WEIGHT: 253 LBS

## 2024-11-12 DIAGNOSIS — Z79.4 TYPE 2 DIABETES MELLITUS WITHOUT COMPLICATION, WITH LONG-TERM CURRENT USE OF INSULIN (MULTI): ICD-10-CM

## 2024-11-12 DIAGNOSIS — M79.672 PAIN IN BOTH FEET: ICD-10-CM

## 2024-11-12 DIAGNOSIS — E11.9 TYPE 2 DIABETES MELLITUS WITHOUT COMPLICATION, WITH LONG-TERM CURRENT USE OF INSULIN (MULTI): ICD-10-CM

## 2024-11-12 DIAGNOSIS — E11.49 TYPE II DIABETES MELLITUS WITH NEUROLOGICAL MANIFESTATIONS (MULTI): ICD-10-CM

## 2024-11-12 DIAGNOSIS — M79.671 PAIN IN BOTH FEET: ICD-10-CM

## 2024-11-12 DIAGNOSIS — B35.1 ONYCHOMYCOSIS: Primary | ICD-10-CM

## 2024-11-12 DIAGNOSIS — L97.521 SKIN ULCER OF SMALL TOE OF LEFT FOOT, LIMITED TO BREAKDOWN OF SKIN: ICD-10-CM

## 2024-11-12 DIAGNOSIS — I87.2 CHRONIC VENOUS INSUFFICIENCY OF LOWER EXTREMITY: ICD-10-CM

## 2024-11-12 PROCEDURE — 1160F RVW MEDS BY RX/DR IN RCRD: CPT | Performed by: PODIATRIST

## 2024-11-12 PROCEDURE — 99417 PROLNG OP E/M EACH 15 MIN: CPT | Performed by: PODIATRIST

## 2024-11-12 PROCEDURE — 99213 OFFICE O/P EST LOW 20 MIN: CPT | Performed by: PODIATRIST

## 2024-11-12 PROCEDURE — 11721 DEBRIDE NAIL 6 OR MORE: CPT | Mod: 59 | Performed by: PODIATRIST

## 2024-11-12 PROCEDURE — 1159F MED LIST DOCD IN RCRD: CPT | Performed by: PODIATRIST

## 2024-11-12 PROCEDURE — 11721 DEBRIDE NAIL 6 OR MORE: CPT | Performed by: PODIATRIST

## 2024-11-12 PROCEDURE — 1157F ADVNC CARE PLAN IN RCRD: CPT | Performed by: PODIATRIST

## 2024-11-12 PROCEDURE — 99203 OFFICE O/P NEW LOW 30 MIN: CPT | Performed by: PODIATRIST

## 2024-11-12 RX ORDER — POTASSIUM CHLORIDE 750 MG/1
10 CAPSULE, EXTENDED RELEASE ORAL DAILY
Qty: 90 CAPSULE | Refills: 0 | Status: SHIPPED | OUTPATIENT
Start: 2024-11-12

## 2024-11-12 NOTE — PROGRESS NOTES
Chief Complaint   Patient presents with    DM Foot Care     Patient is here for first time for diabetic nail care     HPI: C/O painful toenails that he cannot reach since his back surgery.  Gets chronic edema b/l lower extremity.  Denies any weeping bulla or ulcerations.  PCP:   Larry   Last Visit: 9/8/24    PMH, PSx, Medications and allergies reviewed.  ROS negative except for what is stated in HPI.    Physical Exam  Patient alert, oriented, no acute distress  Respiratory: non labored breathing  Psychiatric: Mood and affect normal/baseline  HEENT: sclera clear    VASC: +2/4 DP pulses B/L.  Unable to palpate PT pulses B/L secondary to edema.  CFT brisk all digits.  Skin temperature is warm to warm proximal distal B/L.  (-)hair growth B/L.  Moderate LE edema B/L and multiple varicosities.  No weeping no bulla noted B/L.    NEURO: Vibratory diminished 1st MPJ  B/L, intact medial malleoli B/L.  Light touch intact B/L.   5.07 [place] monofilament intact B/L.    DERM: Nails are extremely elongated nails 1-5 bilateral are thickened, discolored, crumbly, painful and elongated with subungual debris. Pain on palpation to the nails. No cellulitis noted.  Upon debridement of toenails there is a ulceration noted at the distal left fifth digit measuring 2 mm in size with a very shallow depth and a red granular base.  Brown-red discoloration to the legs bilaterally consistent with chronic edema.    MUSCULOSKEL: +5/5 muscle strength B/L.  HAV B/L noted.     Lab Results   Component Value Date    HGBA1C 7.9 (H) 09/18/2024      Assessment and Plan  #1  Onychomycosis  Discussed pathology and treatment options  Recommend routine palliative care patient is agreeable to this  Debrided all nails in length and thickness  Follow-up 3 months    #2 ulcer fifth left digit  Discussed findings, this was secondary to the very elongated toenail  Topical Neosporin and Band-Aid until resolved  Follow-up as needed    #3 chronic venous  insufficiency  Discussed findings in detail  Elevate the legs throughout the day  Follow-up as needed    #4 DM with peripheral neuropathy  Discussed findings in detail  Avoid barefoot walking  Daily foot evaluations  Control glucose  Follow-up 3 months

## 2024-11-19 ENCOUNTER — APPOINTMENT (OUTPATIENT)
Dept: PRIMARY CARE | Facility: CLINIC | Age: 79
End: 2024-11-19
Payer: MEDICARE

## 2024-11-19 VITALS
SYSTOLIC BLOOD PRESSURE: 118 MMHG | TEMPERATURE: 97.2 F | DIASTOLIC BLOOD PRESSURE: 72 MMHG | HEIGHT: 66 IN | WEIGHT: 254 LBS | BODY MASS INDEX: 40.82 KG/M2 | OXYGEN SATURATION: 98 % | HEART RATE: 82 BPM | RESPIRATION RATE: 16 BRPM

## 2024-11-19 DIAGNOSIS — E11.9 TYPE 2 DIABETES MELLITUS WITHOUT COMPLICATION, WITH LONG-TERM CURRENT USE OF INSULIN (MULTI): Primary | ICD-10-CM

## 2024-11-19 DIAGNOSIS — Z79.4 TYPE 2 DIABETES MELLITUS WITHOUT COMPLICATION, WITH LONG-TERM CURRENT USE OF INSULIN (MULTI): Primary | ICD-10-CM

## 2024-11-19 DIAGNOSIS — I48.91 ATRIAL FIBRILLATION, UNSPECIFIED TYPE (MULTI): ICD-10-CM

## 2024-11-19 DIAGNOSIS — I10 PRIMARY HYPERTENSION: Chronic | ICD-10-CM

## 2024-11-19 PROCEDURE — 3074F SYST BP LT 130 MM HG: CPT | Performed by: FAMILY MEDICINE

## 2024-11-19 PROCEDURE — 99214 OFFICE O/P EST MOD 30 MIN: CPT | Performed by: FAMILY MEDICINE

## 2024-11-19 PROCEDURE — 1157F ADVNC CARE PLAN IN RCRD: CPT | Performed by: FAMILY MEDICINE

## 2024-11-19 PROCEDURE — 3078F DIAST BP <80 MM HG: CPT | Performed by: FAMILY MEDICINE

## 2024-11-19 PROCEDURE — 1158F ADVNC CARE PLAN TLK DOCD: CPT | Performed by: FAMILY MEDICINE

## 2024-11-19 PROCEDURE — 1036F TOBACCO NON-USER: CPT | Performed by: FAMILY MEDICINE

## 2024-11-19 PROCEDURE — 1159F MED LIST DOCD IN RCRD: CPT | Performed by: FAMILY MEDICINE

## 2024-11-19 PROCEDURE — 1123F ACP DISCUSS/DSCN MKR DOCD: CPT | Performed by: FAMILY MEDICINE

## 2024-11-19 ASSESSMENT — ENCOUNTER SYMPTOMS
COUGH: 0
FEVER: 0
NEUROLOGICAL NEGATIVE: 1

## 2024-11-19 NOTE — PROGRESS NOTES
"Subjective   Patient ID: Iker Peralta is a 79 y.o. male who presents for Follow-up (Medication recheck).  Patient doing well.  Patient saw his cardiology recently.  He says his valve is looking good.  He is having no chest pain shortness of breath no increased leg swelling.  No dizziness.  No dyspnea.  He is tolerating his medications.  HPI     Review of Systems   Constitutional:  Negative for fever.   Respiratory:  Negative for cough.    Cardiovascular:  Negative for chest pain.   Genitourinary: Negative.    Neurological: Negative.        Objective   /72 (BP Location: Right arm, Patient Position: Sitting, BP Cuff Size: Large adult)   Pulse 82   Temp 36.2 °C (97.2 °F)   Resp 16   Ht 1.664 m (5' 5.5\")   Wt 115 kg (254 lb)   SpO2 98%   BMI 41.62 kg/m²     Physical Exam  Vitals reviewed.   Constitutional:       General: He is not in acute distress.     Appearance: Normal appearance. He is well-developed.   HENT:      Head: Normocephalic.   Cardiovascular:      Rate and Rhythm: Normal rate and regular rhythm.      Heart sounds: Normal heart sounds.   Pulmonary:      Effort: Pulmonary effort is normal.      Breath sounds: Normal breath sounds.   Skin:     Findings: No rash.   Neurological:      Mental Status: He is alert.      Sensory: No sensory deficit.   Psychiatric:         Mood and Affect: Mood normal.         Behavior: Behavior normal.         Assessment/Plan   Problem List Items Addressed This Visit       Hypertension (Chronic)    Type 2 diabetes mellitus - Primary (Chronic)     Other Visit Diagnoses       Atrial fibrillation, unspecified type (Multi)                 Patient sees [facility] cardiology.  Patient's glucose is under control.  He is due an A1c at the end of December.  Patient's blood pressure is doing really well.  He is continue his medications  If any chest pain shortness of breath any nausea vomiting diarrhea fever headache any concerning symptoms go to ER  Follow-up in 3 to 4 " months

## 2024-11-20 ENCOUNTER — ANTICOAGULATION - WARFARIN VISIT (OUTPATIENT)
Dept: PHARMACY | Facility: CLINIC | Age: 79
End: 2024-11-20
Payer: MEDICARE

## 2024-11-20 DIAGNOSIS — Z95.2 S/P AVR (AORTIC VALVE REPLACEMENT): Chronic | ICD-10-CM

## 2024-11-20 DIAGNOSIS — I48.0 PAROXYSMAL ATRIAL FIBRILLATION (MULTI): Primary | Chronic | ICD-10-CM

## 2024-11-20 LAB
POC INR: 2.8
POC PROTHROMBIN TIME: NORMAL

## 2024-11-20 PROCEDURE — 99212 OFFICE O/P EST SF 10 MIN: CPT

## 2024-11-20 PROCEDURE — 85610 PROTHROMBIN TIME: CPT | Mod: QW

## 2024-11-20 NOTE — PROGRESS NOTES
Coumadin Clinic Visit Note    HPI  Iker Peralta is a 79 y.o. male with history of Atrial Fibrillation/Atrial Flutter and Aortic Valve Replacement who presents today for anticoagulation monitoring and adjustment. Last INR 2.4 on 10/15 (5 week follow up).  Current dose: 1 mg (2 mg x 0.5) every Sat; 2 mg (2 mg x 1) all other days   INR goal: 2.0-3.0    Subjective  Patient verified warfarin dose   No missed doses  No unusual bruising or bleeding  No changes to medications  Consistent dietary green intake  No recent hospital admissions   No anticipated procedures at this time    Assessment: INR of 2.8 is Therapeutic today for goal range of 2.0-3.0.  Plan: No changes to warfarin dose today  Follow Up: Next appointment 5 weeks      Debby Richards, RonitD

## 2024-12-27 ENCOUNTER — ANTICOAGULATION - WARFARIN VISIT (OUTPATIENT)
Dept: PHARMACY | Facility: CLINIC | Age: 79
End: 2024-12-27
Payer: MEDICARE

## 2024-12-27 DIAGNOSIS — I48.0 PAROXYSMAL ATRIAL FIBRILLATION (MULTI): Primary | Chronic | ICD-10-CM

## 2024-12-27 DIAGNOSIS — Z95.2 S/P AVR (AORTIC VALVE REPLACEMENT): Chronic | ICD-10-CM

## 2024-12-27 LAB
POC INR: 2.5
POC PROTHROMBIN TIME: NORMAL

## 2024-12-27 PROCEDURE — 85610 PROTHROMBIN TIME: CPT | Mod: QW | Performed by: PHARMACIST

## 2024-12-27 PROCEDURE — 99212 OFFICE O/P EST SF 10 MIN: CPT | Performed by: PHARMACIST

## 2024-12-27 NOTE — PROGRESS NOTES
Verified current dose with pt.    No new meds or med changes since last visit.  Pt denies unusual bleed/bruise.  No upcoming procedures.  No missed doses  No diet changes  Inr = 2.5  Continue same dose and check again in 5 weeks.

## 2025-01-31 ENCOUNTER — ANTICOAGULATION - WARFARIN VISIT (OUTPATIENT)
Dept: PHARMACY | Facility: CLINIC | Age: 80
End: 2025-01-31
Payer: MEDICARE

## 2025-01-31 DIAGNOSIS — I48.0 PAROXYSMAL ATRIAL FIBRILLATION (MULTI): Primary | Chronic | ICD-10-CM

## 2025-01-31 DIAGNOSIS — Z95.2 S/P AVR (AORTIC VALVE REPLACEMENT): Chronic | ICD-10-CM

## 2025-01-31 LAB
POC INR: 2
POC PROTHROMBIN TIME: NORMAL

## 2025-01-31 PROCEDURE — 85610 PROTHROMBIN TIME: CPT | Mod: QW | Performed by: PHARMACIST

## 2025-01-31 PROCEDURE — 99212 OFFICE O/P EST SF 10 MIN: CPT | Performed by: PHARMACIST

## 2025-01-31 NOTE — PROGRESS NOTES
Coumadin Clinic Visit Note    Patient presents today for anticoagulation monitoring and adjustment.  Patient verified warfarin dosing schedule  Patient denies missing any doses  Patient denies unusual bruising or bleeding  Patient denies changes to medications, alcohol or tobacco use.  Consistent dietary green intake  There are no anticipated procedures at this time  INR Therapeutic today at 2.0  No changes to warfarin dose today  Next appointment 5 weeks      Oriana Ortiz, PharmD

## 2025-02-07 ENCOUNTER — APPOINTMENT (OUTPATIENT)
Dept: PHARMACY | Facility: CLINIC | Age: 80
End: 2025-02-07
Payer: MEDICARE

## 2025-02-10 DIAGNOSIS — E11.9 TYPE 2 DIABETES MELLITUS WITHOUT COMPLICATION, WITH LONG-TERM CURRENT USE OF INSULIN (MULTI): ICD-10-CM

## 2025-02-10 DIAGNOSIS — E87.6 HYPOKALEMIA: Primary | ICD-10-CM

## 2025-02-10 DIAGNOSIS — Z79.4 TYPE 2 DIABETES MELLITUS WITHOUT COMPLICATION, WITH LONG-TERM CURRENT USE OF INSULIN (MULTI): ICD-10-CM

## 2025-02-10 RX ORDER — POTASSIUM CHLORIDE 750 MG/1
10 CAPSULE, EXTENDED RELEASE ORAL DAILY
Qty: 90 CAPSULE | Refills: 0 | Status: SHIPPED | OUTPATIENT
Start: 2025-02-10

## 2025-02-13 ENCOUNTER — APPOINTMENT (OUTPATIENT)
Dept: PODIATRY | Facility: CLINIC | Age: 80
End: 2025-02-13
Payer: MEDICARE

## 2025-02-18 ENCOUNTER — APPOINTMENT (OUTPATIENT)
Dept: PRIMARY CARE | Facility: CLINIC | Age: 80
End: 2025-02-18
Payer: MEDICARE

## 2025-02-18 VITALS
DIASTOLIC BLOOD PRESSURE: 72 MMHG | WEIGHT: 259 LBS | SYSTOLIC BLOOD PRESSURE: 124 MMHG | RESPIRATION RATE: 16 BRPM | OXYGEN SATURATION: 98 % | HEART RATE: 68 BPM | TEMPERATURE: 96.4 F | BODY MASS INDEX: 42.44 KG/M2

## 2025-02-18 DIAGNOSIS — I48.91 ATRIAL FIBRILLATION, UNSPECIFIED TYPE (MULTI): ICD-10-CM

## 2025-02-18 DIAGNOSIS — Z79.4 TYPE 2 DIABETES MELLITUS WITHOUT COMPLICATION, WITH LONG-TERM CURRENT USE OF INSULIN (MULTI): Primary | ICD-10-CM

## 2025-02-18 DIAGNOSIS — C67.0 MALIGNANT NEOPLASM OF TRIGONE OF BLADDER (MULTI): ICD-10-CM

## 2025-02-18 DIAGNOSIS — L97.521 SKIN ULCER OF SMALL TOE OF LEFT FOOT, LIMITED TO BREAKDOWN OF SKIN: ICD-10-CM

## 2025-02-18 DIAGNOSIS — E11.49 TYPE II DIABETES MELLITUS WITH NEUROLOGICAL MANIFESTATIONS (MULTI): ICD-10-CM

## 2025-02-18 DIAGNOSIS — I10 PRIMARY HYPERTENSION: ICD-10-CM

## 2025-02-18 DIAGNOSIS — E11.9 TYPE 2 DIABETES MELLITUS WITHOUT COMPLICATION, WITH LONG-TERM CURRENT USE OF INSULIN (MULTI): Primary | ICD-10-CM

## 2025-02-18 DIAGNOSIS — N18.30 STAGE 3 CHRONIC KIDNEY DISEASE, UNSPECIFIED WHETHER STAGE 3A OR 3B CKD (MULTI): ICD-10-CM

## 2025-02-18 PROBLEM — S06.5XAA SUBDURAL HEMATOMA (MULTI): Chronic | Status: RESOLVED | Noted: 2023-04-25 | Resolved: 2025-02-18

## 2025-02-18 PROCEDURE — 3074F SYST BP LT 130 MM HG: CPT | Performed by: FAMILY MEDICINE

## 2025-02-18 PROCEDURE — 3078F DIAST BP <80 MM HG: CPT | Performed by: FAMILY MEDICINE

## 2025-02-18 PROCEDURE — G2211 COMPLEX E/M VISIT ADD ON: HCPCS | Performed by: FAMILY MEDICINE

## 2025-02-18 PROCEDURE — 1157F ADVNC CARE PLAN IN RCRD: CPT | Performed by: FAMILY MEDICINE

## 2025-02-18 PROCEDURE — 1159F MED LIST DOCD IN RCRD: CPT | Performed by: FAMILY MEDICINE

## 2025-02-18 PROCEDURE — 1036F TOBACCO NON-USER: CPT | Performed by: FAMILY MEDICINE

## 2025-02-18 PROCEDURE — 99214 OFFICE O/P EST MOD 30 MIN: CPT | Performed by: FAMILY MEDICINE

## 2025-02-18 ASSESSMENT — ENCOUNTER SYMPTOMS
WEAKNESS: 0
FEVER: 0
SHORTNESS OF BREATH: 0
DIZZINESS: 0
HEADACHES: 0
VOMITING: 0
CONSTIPATION: 0
NUMBNESS: 0
COUGH: 0
DIARRHEA: 0
ABDOMINAL PAIN: 0
WHEEZING: 0

## 2025-02-19 DIAGNOSIS — N18.30 STAGE 3 CHRONIC KIDNEY DISEASE, UNSPECIFIED WHETHER STAGE 3A OR 3B CKD (MULTI): Primary | Chronic | ICD-10-CM

## 2025-02-19 LAB
ANION GAP SERPL CALCULATED.4IONS-SCNC: 8 MMOL/L (CALC) (ref 7–17)
BUN SERPL-MCNC: 24 MG/DL (ref 7–25)
BUN/CREAT SERPL: 17 (CALC) (ref 6–22)
CALCIUM SERPL-MCNC: 10 MG/DL (ref 8.6–10.3)
CHLORIDE SERPL-SCNC: 103 MMOL/L (ref 98–110)
CO2 SERPL-SCNC: 23 MMOL/L (ref 20–32)
CREAT SERPL-MCNC: 1.44 MG/DL (ref 0.7–1.28)
EGFRCR SERPLBLD CKD-EPI 2021: 50 ML/MIN/1.73M2
GLUCOSE SERPL-MCNC: 171 MG/DL (ref 65–139)
POTASSIUM SERPL-SCNC: 5.3 MMOL/L (ref 3.5–5.3)
SODIUM SERPL-SCNC: 134 MMOL/L (ref 135–146)

## 2025-02-19 NOTE — PROGRESS NOTES
Subjective   Patient ID: Iker Peralta is a 79 y.o. male who presents for Follow-up.    Patient's glucose numbers are fine.  Patient has had no chest pain or short of breath.  No nausea, no vomiting no diarrhea.  No numbness tingling.  Patient has no sores on his legs.  Overall patient is doing well.  He is try to eat better, trying to continue his physical activity.  No rash, patient is tolerating his medications.         Review of Systems   Constitutional:  Negative for fever.   Respiratory:  Negative for cough, shortness of breath and wheezing.    Cardiovascular:  Negative for chest pain and leg swelling.   Gastrointestinal:  Negative for abdominal pain, constipation, diarrhea and vomiting.   Skin:  Negative for rash.   Neurological:  Negative for dizziness, weakness, numbness and headaches.       Objective   /72 (BP Location: Left arm, Patient Position: Sitting, BP Cuff Size: Large adult)   Pulse 68   Temp 35.8 °C (96.4 °F)   Resp 16   Wt 117 kg (259 lb)   SpO2 98%   BMI 42.44 kg/m²     Physical Exam  Vitals reviewed.   Constitutional:       General: He is not in acute distress.     Appearance: Normal appearance. He is well-developed.   HENT:      Head: Normocephalic.   Eyes:      Conjunctiva/sclera: Conjunctivae normal.   Cardiovascular:      Rate and Rhythm: Normal rate and regular rhythm.      Heart sounds: Normal heart sounds.   Pulmonary:      Effort: Pulmonary effort is normal.      Breath sounds: Normal breath sounds.   Skin:     Findings: No lesion or rash.   Neurological:      Mental Status: He is alert.      Sensory: No sensory deficit.   Psychiatric:         Mood and Affect: Mood normal.         Behavior: Behavior normal.         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  Will check his blood work.  He is to continue to make sure he is hydrating, eat better, and continue his physical activity.  Patient aware if any chest pain shortness of breath any nausea vomiting diarrhea fever headache any concerning symptoms go to ER  Follow-up in 3 months

## 2025-03-03 ENCOUNTER — TELEPHONE (OUTPATIENT)
Dept: PRIMARY CARE | Facility: CLINIC | Age: 80
End: 2025-03-03
Payer: MEDICARE

## 2025-03-07 ENCOUNTER — APPOINTMENT (OUTPATIENT)
Dept: PHARMACY | Facility: CLINIC | Age: 80
End: 2025-03-07
Payer: MEDICARE

## 2025-03-13 ENCOUNTER — ANTICOAGULATION - WARFARIN VISIT (OUTPATIENT)
Dept: PHARMACY | Facility: CLINIC | Age: 80
End: 2025-03-13
Payer: MEDICARE

## 2025-03-13 DIAGNOSIS — I48.0 PAROXYSMAL ATRIAL FIBRILLATION (MULTI): Primary | Chronic | ICD-10-CM

## 2025-03-13 DIAGNOSIS — Z95.2 S/P AVR (AORTIC VALVE REPLACEMENT): Chronic | ICD-10-CM

## 2025-03-13 LAB
POC INR: 3.9
POC PROTHROMBIN TIME: NORMAL

## 2025-03-13 PROCEDURE — 85610 PROTHROMBIN TIME: CPT | Mod: QW | Performed by: PHARMACIST

## 2025-03-13 PROCEDURE — 99212 OFFICE O/P EST SF 10 MIN: CPT | Performed by: PHARMACIST

## 2025-03-13 NOTE — PROGRESS NOTES
Verified current dose with pt.    No new meds or med changes since last visit.  Pt denies unusual bleed/bruise.  No upcoming procedures.  No missed doses  No etoh  Pt says he has been eating salads every day   Inr = 3.9 - pt says he has used tylenol (head cold?  Flu?) - just last week.    Also he was feeling unwell - may be a reason for inr to be high  Continue same dose and check again in 4 weeks.

## 2025-03-25 ENCOUNTER — CLINICAL SUPPORT (OUTPATIENT)
Dept: PRIMARY CARE | Facility: CLINIC | Age: 80
End: 2025-03-25
Payer: MEDICARE

## 2025-03-25 DIAGNOSIS — Z23 ENCOUNTER FOR IMMUNIZATION: Primary | ICD-10-CM

## 2025-03-25 DIAGNOSIS — Z23 IMMUNIZATION DUE: Primary | ICD-10-CM

## 2025-03-25 PROCEDURE — G0009 ADMIN PNEUMOCOCCAL VACCINE: HCPCS | Performed by: FAMILY MEDICINE

## 2025-03-25 PROCEDURE — 90677 PCV20 VACCINE IM: CPT | Performed by: FAMILY MEDICINE

## 2025-04-08 ENCOUNTER — APPOINTMENT (OUTPATIENT)
Dept: PRIMARY CARE | Facility: CLINIC | Age: 80
End: 2025-04-08
Payer: MEDICARE

## 2025-04-10 ENCOUNTER — ANTICOAGULATION - WARFARIN VISIT (OUTPATIENT)
Dept: PHARMACY | Facility: CLINIC | Age: 80
End: 2025-04-10
Payer: MEDICARE

## 2025-04-10 DIAGNOSIS — Z95.2 S/P AVR (AORTIC VALVE REPLACEMENT): Chronic | ICD-10-CM

## 2025-04-10 DIAGNOSIS — I48.0 PAROXYSMAL ATRIAL FIBRILLATION (MULTI): Primary | Chronic | ICD-10-CM

## 2025-04-10 LAB
POC INR: 2.6
POC PROTHROMBIN TIME: NORMAL

## 2025-04-10 PROCEDURE — 85610 PROTHROMBIN TIME: CPT | Mod: QW

## 2025-04-10 PROCEDURE — 99212 OFFICE O/P EST SF 10 MIN: CPT

## 2025-04-10 NOTE — PROGRESS NOTES
Coumadin Clinic Visit Note    Patient verified warfarin dose  No missed doses  No unusual bruising or bleeding  No changes to medications  Consistent dietary green intake  Patient is going to have a bladder scope in early may - states he gets it done yearly  Patient usually stops warfarin 5 days prior to procedure without bridging   Procedure is not scheduled yet   Advised patient to contact us when scheduled so we can recheck INR 5 days post procedure   INR Therapeutic today at 2.6  No changes to warfarin dose today  Next appointment 5 weeks for now until get procedure scheduled then may need to move up appointment     Shasha Obregon, Pharm D

## 2025-04-21 DIAGNOSIS — Z79.4 TYPE 2 DIABETES MELLITUS WITHOUT COMPLICATION, WITH LONG-TERM CURRENT USE OF INSULIN: ICD-10-CM

## 2025-04-21 DIAGNOSIS — E11.9 TYPE 2 DIABETES MELLITUS WITHOUT COMPLICATION, WITH LONG-TERM CURRENT USE OF INSULIN: ICD-10-CM

## 2025-04-21 RX ORDER — METOPROLOL TARTRATE 50 MG/1
50 TABLET ORAL 2 TIMES DAILY
Qty: 180 TABLET | Refills: 1 | Status: SHIPPED | OUTPATIENT
Start: 2025-04-21

## 2025-05-09 ENCOUNTER — TELEPHONE (OUTPATIENT)
Dept: PHARMACY | Facility: CLINIC | Age: 80
End: 2025-05-09
Payer: MEDICARE

## 2025-05-09 NOTE — TELEPHONE ENCOUNTER
I called pt at home letting him know that we received a note from   That it is ok for him to hold warfarin x5 days without bridging.  Pt does not know the date of his urological procedure yet but will call us with the date. He also knows that his appt post procedure may need to be changed.

## 2025-05-12 DIAGNOSIS — Z79.4 TYPE 2 DIABETES MELLITUS WITHOUT COMPLICATION, WITH LONG-TERM CURRENT USE OF INSULIN: ICD-10-CM

## 2025-05-12 DIAGNOSIS — E11.9 TYPE 2 DIABETES MELLITUS WITHOUT COMPLICATION, WITH LONG-TERM CURRENT USE OF INSULIN: ICD-10-CM

## 2025-05-12 RX ORDER — POTASSIUM CHLORIDE 750 MG/1
10 CAPSULE, EXTENDED RELEASE ORAL DAILY
Qty: 90 CAPSULE | Refills: 0 | Status: SHIPPED | OUTPATIENT
Start: 2025-05-12

## 2025-05-15 ENCOUNTER — ANTICOAGULATION - WARFARIN VISIT (OUTPATIENT)
Dept: PHARMACY | Facility: CLINIC | Age: 80
End: 2025-05-15
Payer: MEDICARE

## 2025-05-15 DIAGNOSIS — Z95.2 S/P AVR (AORTIC VALVE REPLACEMENT): Chronic | ICD-10-CM

## 2025-05-15 DIAGNOSIS — I48.0 PAROXYSMAL ATRIAL FIBRILLATION (MULTI): Primary | Chronic | ICD-10-CM

## 2025-05-15 LAB
ANION GAP SERPL CALCULATED.4IONS-SCNC: 11 MMOL/L (CALC) (ref 7–17)
BUN SERPL-MCNC: 21 MG/DL (ref 7–25)
BUN/CREAT SERPL: 14 (CALC) (ref 6–22)
CALCIUM SERPL-MCNC: 9.8 MG/DL (ref 8.6–10.3)
CHLORIDE SERPL-SCNC: 100 MMOL/L (ref 98–110)
CO2 SERPL-SCNC: 23 MMOL/L (ref 20–32)
CREAT SERPL-MCNC: 1.45 MG/DL (ref 0.7–1.28)
EGFRCR SERPLBLD CKD-EPI 2021: 49 ML/MIN/1.73M2
EST. AVERAGE GLUCOSE BLD GHB EST-MCNC: 240 MG/DL
EST. AVERAGE GLUCOSE BLD GHB EST-SCNC: 13.3 MMOL/L
GLUCOSE SERPL-MCNC: 208 MG/DL (ref 65–99)
HBA1C MFR BLD: 10 %
POC INR: 1.5 (ref 0.9–1.1)
POC PROTHROMBIN TIME: ABNORMAL (ref 9.3–12.5)
POTASSIUM SERPL-SCNC: 4.8 MMOL/L (ref 3.5–5.3)
SODIUM SERPL-SCNC: 134 MMOL/L (ref 135–146)

## 2025-05-15 PROCEDURE — 99212 OFFICE O/P EST SF 10 MIN: CPT | Performed by: PHARMACIST

## 2025-05-15 PROCEDURE — 85610 PROTHROMBIN TIME: CPT | Mod: QW | Performed by: PHARMACIST

## 2025-05-15 NOTE — PROGRESS NOTES
Coumadin Clinic Visit Note    Patient verified warfarin dose  No missed doses  No unusual bruising or bleeding  No changes to medications  Consistent dietary green intake, had more salads and a lot of cream spinach   No anticipated procedures at this time, will have bladder scope , no date yet , will let us know   INR Subtherapeutic today at 1.5  Boost today , same weekly   Next appointment 5 weeks      Jeanne Carter, PharmD

## 2025-05-20 ENCOUNTER — APPOINTMENT (OUTPATIENT)
Dept: PRIMARY CARE | Facility: CLINIC | Age: 80
End: 2025-05-20
Payer: MEDICARE

## 2025-05-20 VITALS
WEIGHT: 254 LBS | TEMPERATURE: 97.2 F | DIASTOLIC BLOOD PRESSURE: 70 MMHG | SYSTOLIC BLOOD PRESSURE: 122 MMHG | HEART RATE: 72 BPM | BODY MASS INDEX: 40.82 KG/M2 | OXYGEN SATURATION: 100 % | RESPIRATION RATE: 16 BRPM | HEIGHT: 66 IN

## 2025-05-20 DIAGNOSIS — E11.9 TYPE 2 DIABETES MELLITUS WITHOUT COMPLICATION, WITH LONG-TERM CURRENT USE OF INSULIN: ICD-10-CM

## 2025-05-20 DIAGNOSIS — E11.3213 TYPE 2 DIABETES MELLITUS WITH BOTH EYES AFFECTED BY MILD NONPROLIFERATIVE RETINOPATHY AND MACULAR EDEMA, WITHOUT LONG-TERM CURRENT USE OF INSULIN: ICD-10-CM

## 2025-05-20 DIAGNOSIS — I48.91 ATRIAL FIBRILLATION, UNSPECIFIED TYPE (MULTI): ICD-10-CM

## 2025-05-20 DIAGNOSIS — Z79.4 TYPE 2 DIABETES MELLITUS WITHOUT COMPLICATION, WITH LONG-TERM CURRENT USE OF INSULIN: ICD-10-CM

## 2025-05-20 DIAGNOSIS — Z13.220 LIPID SCREENING: ICD-10-CM

## 2025-05-20 DIAGNOSIS — N18.30 STAGE 3 CHRONIC KIDNEY DISEASE, UNSPECIFIED WHETHER STAGE 3A OR 3B CKD (MULTI): ICD-10-CM

## 2025-05-20 DIAGNOSIS — Z00.00 ROUTINE GENERAL MEDICAL EXAMINATION AT HEALTH CARE FACILITY: Primary | ICD-10-CM

## 2025-05-20 PROCEDURE — 1159F MED LIST DOCD IN RCRD: CPT | Performed by: FAMILY MEDICINE

## 2025-05-20 PROCEDURE — 3078F DIAST BP <80 MM HG: CPT | Performed by: FAMILY MEDICINE

## 2025-05-20 PROCEDURE — 1036F TOBACCO NON-USER: CPT | Performed by: FAMILY MEDICINE

## 2025-05-20 PROCEDURE — 3074F SYST BP LT 130 MM HG: CPT | Performed by: FAMILY MEDICINE

## 2025-05-20 PROCEDURE — G0439 PPPS, SUBSEQ VISIT: HCPCS | Performed by: FAMILY MEDICINE

## 2025-05-20 RX ORDER — WARFARIN 2 MG/1
2 TABLET ORAL EVERY EVENING
Qty: 100 TABLET | Refills: 2 | Status: SHIPPED | OUTPATIENT
Start: 2025-05-20 | End: 2026-05-20

## 2025-05-20 RX ORDER — SIMVASTATIN 40 MG/1
40 TABLET, FILM COATED ORAL DAILY
Qty: 90 TABLET | Refills: 3 | Status: SHIPPED | OUTPATIENT
Start: 2025-05-20

## 2025-05-20 RX ORDER — METOPROLOL TARTRATE 50 MG/1
50 TABLET ORAL 2 TIMES DAILY
Qty: 180 TABLET | Refills: 1 | Status: SHIPPED | OUTPATIENT
Start: 2025-05-20

## 2025-05-20 RX ORDER — METFORMIN HYDROCHLORIDE 500 MG/1
2000 TABLET, EXTENDED RELEASE ORAL NIGHTLY
Qty: 360 TABLET | Refills: 3 | Status: SHIPPED | OUTPATIENT
Start: 2025-05-20

## 2025-05-20 RX ORDER — POTASSIUM CHLORIDE 750 MG/1
10 CAPSULE, EXTENDED RELEASE ORAL DAILY
Qty: 90 CAPSULE | Refills: 0 | Status: SHIPPED | OUTPATIENT
Start: 2025-05-20

## 2025-05-20 RX ORDER — TORSEMIDE 10 MG/1
10 TABLET ORAL DAILY
Qty: 90 TABLET | Refills: 3 | Status: SHIPPED | OUTPATIENT
Start: 2025-05-20

## 2025-05-20 ASSESSMENT — ENCOUNTER SYMPTOMS
ENDOCRINE NEGATIVE: 1
HEMATOLOGIC/LYMPHATIC NEGATIVE: 1
ABDOMINAL PAIN: 0
PSYCHIATRIC NEGATIVE: 1
DIARRHEA: 0
VOMITING: 0
DIZZINESS: 0
LOSS OF SENSATION IN FEET: 0
ALLERGIC/IMMUNOLOGIC NEGATIVE: 1
WEAKNESS: 0
COUGH: 0
HEADACHES: 0
FEVER: 0
CONSTIPATION: 0
ARTHRALGIAS: 1
NUMBNESS: 0
WHEEZING: 0
DEPRESSION: 0
OCCASIONAL FEELINGS OF UNSTEADINESS: 0
EYES NEGATIVE: 1
SHORTNESS OF BREATH: 0

## 2025-05-20 ASSESSMENT — PATIENT HEALTH QUESTIONNAIRE - PHQ9
1. LITTLE INTEREST OR PLEASURE IN DOING THINGS: NOT AT ALL
SUM OF ALL RESPONSES TO PHQ9 QUESTIONS 1 AND 2: 0
2. FEELING DOWN, DEPRESSED OR HOPELESS: NOT AT ALL

## 2025-05-20 NOTE — PROGRESS NOTES
Too busy manSubjective   Iker Peralta is a 80 y.o. male who is here for a routine exam.  Patient is doing okay.  No chest pain or shortness of breath.  No nausea vomiting diarrhea fever.  No headaches, patient is trying to eat better, he has been take his medications.  No numbness no tingling in his feet no sores  Active Problem List  Problem List       Stage 3 chronic kidney disease, unspecified whether stage 3a or 3b CKD (Multi) (Chronic)    Coronary arteriosclerosis (Chronic)    Overview Addendum 10/9/2024 12:27 PM by Felicitas Aguilar   11/24/2009 AVR ... mild on cath before surgery         S/P AVR (aortic valve replacement) (Chronic)    Overview Addendum 10/9/2024 12:27 PM by Felicitas Aguilar   11/24/2009: 25 mm Keon-Ring valve         Hypercholesterolemia (Chronic)    Overview Signed 11/22/2023 11:11 AM by Felicitas Umana         Hypertension (Chronic)    Paroxysmal atrial fibrillation (Multi) (Chronic)    Overview Addendum 10/29/2024  8:50 PM by Rosy Coto MD   Persistent or permanent A-fib on Coumadin. Had SDH d/t fall. No falls since.  [id number] of 4         Type 2 diabetes mellitus (Chronic)    RESOLVED: Subdural hematoma (Multi) (Chronic)    Overview Signed 10/29/2024  8:51 PM by Rosy Coto MD   Due to a fall            Comprehensive Medical/Surgical/Social/Family History  Past Medical History:   Diagnosis Date    Coronary arteriosclerosis 04/25/2023 11/24/2009 AVR: 25 mm Keon-Ring valve - mild on cath before surgery    Dorsalgia, unspecified 08/27/2019    Back pain, acute    Encounter for immunization 02/22/2016    Need for Tdap vaccination    Horseshoe tear of retina without detachment, left eye 02/22/2016    Retinal tear, left    Hypercholesterolemia 04/25/2023    Dr. Umana    Hypertension 04/25/2023    Overweight     Overweight (BMI 25.0-29.9)    Paroxysmal atrial fibrillation (Multi) 04/25/2023    On Coumadin. Had SDH d/t fall. No falls since.    Personal  history of other diseases of the circulatory system 04/23/2020    History of atrial fibrillation    Personal history of other drug therapy 10/09/2015    History of influenza vaccination    Personal history of other endocrine, nutritional and metabolic disease 05/18/2018    History of diabetes mellitus    Personal history of other infectious and parasitic diseases     History of Legionnaire's disease    S/P AVR (aortic valve replacement) 04/25/2023 11/24/2009 AVR: 25 mm Keon-Ring valve    Stage 3 chronic kidney disease, unspecified whether stage 3a or 3b CKD (Multi) 08/06/2024    Subdural hematoma (Multi) 04/25/2023    Type 2 diabetes mellitus 04/25/2023     Past Surgical History:   Procedure Laterality Date    AORTIC VALVE REPLACEMENT  08/30/2021    Aortic Valve Replacement    CHOLECYSTECTOMY  11/26/2014    Cholecystectomy    COLONOSCOPY  10/25/2021    Complete Colonoscopy    OTHER SURGICAL HISTORY  08/30/2021    Cardiac catheterization     Social History     Social History Narrative    Not on file         Allergies and Medications  Codeine  Current Outpatient Medications on File Prior to Visit   Medication Sig Dispense Refill    cholecalciferol (Vitamin D-3) 50 mcg (2,000 unit) capsule Take 1 capsule (2,000 Units) by mouth early in the morning.. 90 capsule 3    cyanocobalamin (Vitamin B-12) 1,000 mcg tablet Take 1 tablet (1,000 mcg) by mouth once daily. 90 tablet 3    tamsulosin (Flomax) 0.4 mg 24 hr capsule Take 1 capsule (0.4 mg) by mouth once daily. 90 capsule 3    [DISCONTINUED] metFORMIN  mg 24 hr tablet Take 4 tablets (2,000 mg) by mouth once daily at bedtime. 360 tablet 3    [DISCONTINUED] metoprolol tartrate (Lopressor) 50 mg tablet TAKE ONE TABLET BY MOUTH TWO TIMES A  tablet 1    [DISCONTINUED] potassium chloride ER (Micro-K) 10 mEq ER capsule TAKE ONE CAPSULE BY MOUTH EVERY DAY 90 capsule 0    [DISCONTINUED] simvastatin (Zocor) 40 mg tablet Take 1 tablet (40 mg) by mouth once  "daily. 90 tablet 3    [DISCONTINUED] torsemide (Demadex) 10 mg tablet Take 1 tablet (10 mg) by mouth once daily. 90 tablet 3    [DISCONTINUED] warfarin (Coumadin) 2 mg tablet Take 1 tablet (2 mg) by mouth once daily in the evening. Take 1 tablet daily except 1 1/2 tablets on ThursdayTake 1 tablet daily except 1 1/2 tablets on Thursday 100 tablet 2    methocarbamol (Robaxin) 500 mg tablet Take 1 tablet (500 mg) by mouth 2 times a day for 10 days. 20 tablet 0     No current facility-administered medications on file prior to visit.       New Concerns:    Lifestyle  Diet:  Well Balanced  Physical Activity: Not on file      Tobacco Use: Medium Risk (5/20/2025)    Patient History     Smoking Tobacco Use: Former     Smokeless Tobacco Use: Never     Passive Exposure: Past      Alcohol Use: Not on file      Depression: Not at risk (5/20/2025)    PHQ-2     PHQ-2 Score: 0      Stress: Not on file       Consultants:    Review of Systems   Constitutional:  Negative for fever.   HENT: Negative.     Eyes: Negative.    Respiratory:  Negative for cough, shortness of breath and wheezing.    Cardiovascular:  Negative for chest pain and leg swelling.   Gastrointestinal:  Negative for abdominal pain, constipation, diarrhea and vomiting.   Endocrine: Negative.    Genitourinary: Negative.    Musculoskeletal:  Positive for arthralgias.   Skin:  Negative for rash.   Allergic/Immunologic: Negative.    Neurological:  Negative for dizziness, weakness, numbness and headaches.   Hematological: Negative.    Psychiatric/Behavioral: Negative.         Objective   /70 (BP Location: Left arm, Patient Position: Sitting, BP Cuff Size: Large adult)   Pulse 72   Temp 36.2 °C (97.2 °F)   Resp 16   Ht 1.664 m (5' 5.5\")   Wt 115 kg (254 lb)   SpO2 100%   BMI 41.62 kg/m²     Physical Exam  Vitals and nursing note reviewed.   Constitutional:       General: He is not in acute distress.     Appearance: Normal appearance. He is not ill-appearing. "   HENT:      Head: Normocephalic.      Right Ear: Tympanic membrane and ear canal normal.      Left Ear: Tympanic membrane and ear canal normal.      Nose: Nose normal.      Mouth/Throat:      Mouth: Mucous membranes are moist.      Pharynx: Oropharynx is clear.   Eyes:      Extraocular Movements: Extraocular movements intact.      Conjunctiva/sclera: Conjunctivae normal.      Pupils: Pupils are equal, round, and reactive to light.   Cardiovascular:      Rate and Rhythm: Normal rate and regular rhythm.      Pulses: Normal pulses.   Pulmonary:      Effort: Pulmonary effort is normal. No respiratory distress.      Breath sounds: No wheezing, rhonchi or rales.   Abdominal:      General: Bowel sounds are normal.      Palpations: Abdomen is soft.      Tenderness: There is no guarding.      Hernia: No hernia is present.   Musculoskeletal:         General: Normal range of motion.      Cervical back: Neck supple.      Right lower leg: No edema.      Left lower leg: No edema.   Skin:     General: Skin is warm.      Capillary Refill: Capillary refill takes less than 2 seconds.      Comments: No sores, sensation intact   Neurological:      General: No focal deficit present.      Mental Status: He is oriented to person, place, and time.      Cranial Nerves: No cranial nerve deficit.      Sensory: No sensory deficit.      Gait: Gait normal.      Deep Tendon Reflexes: Reflexes normal.   Psychiatric:         Mood and Affect: Mood normal.         Behavior: Behavior normal.         Judgment: Judgment normal.         Assessment/Plan   Problem List Items Addressed This Visit       Type 2 diabetes mellitus (Chronic)    Relevant Medications    empagliflozin (Jardiance) 10 mg tablet    metFORMIN  mg 24 hr tablet    metoprolol tartrate (Lopressor) 50 mg tablet    potassium chloride ER (Micro-K) 10 mEq ER capsule    simvastatin (Zocor) 40 mg tablet    torsemide (Demadex) 10 mg tablet    Other Relevant Orders    Referral to Nephrology     CBC    Stage 3 chronic kidney disease, unspecified whether stage 3a or 3b CKD (Multi) (Chronic)    Relevant Medications    empagliflozin (Jardiance) 10 mg tablet    Other Relevant Orders    Referral to Nephrology    CBC    Comprehensive Metabolic Panel     Other Visit Diagnoses         Routine general medical examination at health care facility    -  Primary    Relevant Orders    1 Year Follow Up In [facility] - PCP - Wellness Exam    CBC    Comprehensive Metabolic Panel      Lipid screening        Relevant Orders    Lipid Panel      Atrial fibrillation, unspecified type (Multi)        Relevant Medications    metoprolol tartrate (Lopressor) 50 mg tablet    warfarin (Coumadin) 2 mg tablet            Reviewed Social Determinants of health with patient, discussed healthy lifestyle including 150 minutes of physical activity per week  Ordered/Reviewed baseline labwork -CBC, CMP, Lipid Panel      Patient will start the Jardiance.  But will lower the dose.  He is aware of the side effects of any UTI-like symptoms any yeast, any swelling or redness in the perineum he is to stop the medication and go to ER  Will get screening blood work, patient will follow-up in 3 months  He is to see nephrology.  Hopefully this medication helps protect his kidneys and his heart  If any chest pain shortness of breath any nausea vomiting diarrhea fever headache any concerning symptoms go to ER  Follow-up in 3 months

## 2025-05-21 ENCOUNTER — TELEPHONE (OUTPATIENT)
Dept: PRIMARY CARE | Facility: CLINIC | Age: 80
End: 2025-05-21
Payer: MEDICARE

## 2025-05-21 DIAGNOSIS — N18.30 STAGE 3 CHRONIC KIDNEY DISEASE, UNSPECIFIED WHETHER STAGE 3A OR 3B CKD (MULTI): Primary | Chronic | ICD-10-CM

## 2025-05-21 DIAGNOSIS — E11.49 TYPE II DIABETES MELLITUS WITH NEUROLOGICAL MANIFESTATIONS (MULTI): ICD-10-CM

## 2025-05-21 NOTE — TELEPHONE ENCOUNTER
Pts wife called the jardiance cost 320$ she wanted to know if you wanted to do just a 2 week supply to see if he tolerates it, or should he wait until he sees nephrology please advise.

## 2025-06-02 ENCOUNTER — APPOINTMENT (OUTPATIENT)
Dept: PHARMACY | Facility: HOSPITAL | Age: 80
End: 2025-06-02
Payer: MEDICARE

## 2025-06-02 DIAGNOSIS — E11.9 TYPE 2 DIABETES MELLITUS WITHOUT COMPLICATION, WITH LONG-TERM CURRENT USE OF INSULIN: ICD-10-CM

## 2025-06-02 DIAGNOSIS — E66.01 SEVERE OBESITY (MULTI): ICD-10-CM

## 2025-06-02 DIAGNOSIS — E78.00 HYPERCHOLESTEROLEMIA: Chronic | ICD-10-CM

## 2025-06-02 DIAGNOSIS — N18.30 STAGE 3 CHRONIC KIDNEY DISEASE, UNSPECIFIED WHETHER STAGE 3A OR 3B CKD (MULTI): Chronic | ICD-10-CM

## 2025-06-02 DIAGNOSIS — I10 PRIMARY HYPERTENSION: Chronic | ICD-10-CM

## 2025-06-02 DIAGNOSIS — I25.10 CORONARY ARTERIOSCLEROSIS: Chronic | ICD-10-CM

## 2025-06-02 DIAGNOSIS — Z79.4 TYPE 2 DIABETES MELLITUS WITHOUT COMPLICATION, WITH LONG-TERM CURRENT USE OF INSULIN: ICD-10-CM

## 2025-06-02 DIAGNOSIS — E11.49 TYPE II DIABETES MELLITUS WITH NEUROLOGICAL MANIFESTATIONS (MULTI): Primary | ICD-10-CM

## 2025-06-02 DIAGNOSIS — L97.521 SKIN ULCER OF SMALL TOE OF LEFT FOOT, LIMITED TO BREAKDOWN OF SKIN: ICD-10-CM

## 2025-06-02 RX ORDER — GLIMEPIRIDE 2 MG/1
2 TABLET ORAL
Qty: 30 TABLET | Refills: 0 | Status: SHIPPED | OUTPATIENT
Start: 2025-06-02 | End: 2026-07-07

## 2025-06-02 NOTE — PATIENT INSTRUCTIONS
Medication Changes:   START  Glimepiride 2 mg once daily  WAIT TO START  Sending Jardiance to [facility] for PAP     Patient Assistance (PAP) Screening (VAF)  Patient verbally reports monthly or yearly income which is less than 400% federal poverty level  Application for program has NOT been submitted for the following medications:   Jardiance - email sent 6/2/25  Patient aware this process may take up to 2 weeks once income documents have been sent to the team.  If approved, medication must be filled through [facility] pharmacy and may be picked up or mailed to patient.   If approved, medication will be billed through insurance, and patient assistance team will pay the copay. This will result in a $0 copay for the patient.  Counseled patient on mechanism of action, side effects, contraindications, and what to do if the patient misses a dose. All patients questions were answered.

## 2025-06-02 NOTE — PROGRESS NOTES
Clinical Pharmacy Appointment    Patient ID: [id number]  Iker Peralta is a 80 y.o. male who presents for First appointment. Reason for Referral: T2DM   Referring Provider and PCP: Abelardo Juarez, DO     Subjective     Medical History[1]     Surgical History[2]     Social Hx:   reports that he quit smoking about 36 years ago. His smoking use included cigars. He has been exposed to tobacco smoke. He has never used smokeless tobacco. He reports that he does not drink alcohol and does not use drugs.     Family History[3]     HPI    DIABETES MELLITUS TYPE 2  Diagnosed with diabetes:  >10 years. Known diabetic complications: autonomic neuropathy, chronic kidney disease, and obesity  Does patient follow with Endocrinology: No  Last optometry exam: <1 year   Most recent visit in Podiatry: <1 year  patient denies sores or cuts on feet today     Current diabetic medications include:  metformin   Clarifications to above regimen: has not started Jardiance due to cost  Adverse Effects: none    Past diabetic medications include:  ESCOBAR (glipizide)    Glucose Readings: BGs are high in the morning, BGs are high in the evening, BGs are high around dinner, BGs are high at bedtime  Glucometer/CGM Type: unknown  Patient tests BG 1 times per day  Current home BG readings:  FBG: Range (110 - 180); Average (160); # of readings above goal:   Sugars consistently >200?  Yes, 240 average with hba1c.    HYPOGLYCEMIA  Any episodes of hypoglycemia? No, never.    Did patient treat episode of hypoglycemia appropriately?    Does the patient have a prescription for ready-to-use Glucagon? no  Does pt have proteinuria? Yes    BMI: 42  Class III Obesity  Current Weight:  254 lbs  Lifestyle:  Diet: 2 meals/day.  BK: coffee 1-2 pieces of toast or bowl of wheat (no sugar)  DN: meat + vegetable + potato   Snacks: tries not to snack  Drinks: 8-9 glasses water, coffee in the AM, diet coke  Eats out 0 a week   Physical Activity:   No cane, cannot bend over  well as back was broken after a fall (brain bleed in 2021)   _____________________________________________________________________________    PHARMACIST MEDICATION REVIEW    Drug Interactions  The following drug interactions were noted:    Drug-Drug: warfarin and simvastatinIncreased hypoprothrombinemic effects of warfarin may result in a risk for bleeding.  Metformin in renal dysfunction GFR <40    Medication System Management  Patient's preferred pharmacy: [facility]   Adherence  Missed doses a week: 0  Pill organizer? unknown  Medication Access:  Can NOT afford medication  [facility] P[facility] Screen: eligible  Annual Income: $3500  $2,000 deductible   Jardiance and ozempic is ~$400     _____________________________________________________________________________    Labs  Hepatic dysfunction? NO  Kidney dysfunction? YES  Does pt have proteinuria? YES  _______________________________    Objective   Allergies[4]  Social History     Social History Narrative    Not on file      Medication Review  Current Outpatient Medications   Medication Instructions    cholecalciferol (VITAMIN D-3) 2,000 Units, oral, Daily    cyanocobalamin (VITAMIN B-12) 1,000 mcg, oral, Daily    empagliflozin (JARDIANCE) 10 mg, oral, Daily    glimepiride (AMARYL) 2 mg, oral, Daily before breakfast    metFORMIN XR (GLUCOPHAGE-XR) 2,000 mg, oral, Nightly    metoprolol tartrate (LOPRESSOR) 50 mg, oral, 2 times daily    potassium chloride ER (Micro-K) 10 mEq ER capsule 10 mEq, oral, Daily    simvastatin (ZOCOR) 40 mg, oral, Daily    tamsulosin (FLOMAX) 0.4 mg, oral, Daily    torsemide (DEMADEX) 10 mg, oral, Daily    warfarin (COUMADIN) 2 mg, oral, Every evening, Take 1 tablet daily except 1 1/2 tablets on ThursdayTake 1 tablet daily except 1 1/2 tablets on Thursday      Vitals  BP Readings from Last 2 Encounters:   05/20/25 122/70   02/18/25 124/72     BMI Readings from Last 1 Encounters:   05/20/25 41.62 kg/m²      Labs    A1C  Lab Results   Component Value  Date    HGBA1C 10.0 (H) 05/14/2025    HGBA1C 7.9 (H) 09/18/2024    HGBA1C 8.4 (H) 04/11/2024     Lab Results   Component Value Date    CALCIUM 9.8 05/14/2025     (L) 05/14/2025    K 4.8 05/14/2025    CO2 23 05/14/2025     05/14/2025    BUN 21 05/14/2025    CREATININE 1.45 (H) 05/14/2025    EGFR 49 (L) 05/14/2025     Lab Results   Component Value Date    ALT 12 04/11/2024    AST 15 04/11/2024    ALKPHOS 58 04/11/2024    BILITOT 0.9 04/11/2024     FLP  Lab Results   Component Value Date    CHOL 119 04/11/2024    HDL 41.8 04/11/2024     Urine Microalbumin  Lab Results   Component Value Date    MICROALBCREA SEE COMMENT 09/21/2023     Weight Management  Wt Readings from Last 3 Encounters:   05/20/25 115 kg (254 lb)   02/18/25 117 kg (259 lb)   11/19/24 115 kg (254 lb)        _____________________________________________________________________________    Assessment/Plan   Problem List Items Addressed This Visit       Type 2 diabetes mellitus (Chronic)    Severe obesity (Multi)    Relevant Medications    glimepiride (AmaryL) 2 mg tablet    Other Relevant Orders    Referral to Clinical Pharmacy    Stage 3 chronic kidney disease, unspecified whether stage 3a or 3b CKD (Multi) (Chronic)    Relevant Medications    glimepiride (AmaryL) 2 mg tablet    Other Relevant Orders    Referral to Clinical Pharmacy    Skin ulcer of small toe of left foot, limited to breakdown of skin    Relevant Medications    glimepiride (AmaryL) 2 mg tablet    Other Relevant Orders    Referral to Clinical Pharmacy    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  If approved, medication must be filled through [facility] pharmacy and may be picked up or mailed to patient.   If approved, medication will be billed through insurance, and patient assistance team will pay the copay. This will result in a $0 copay for the patient.  Counseled patient on mechanism of action, side effects, contraindications, and what to do if the patient misses a dose. All patients questions were answered.      Patient Goals  Fasting B - 130 mg/dL  Postprandial BG: less than 180 mg/dL  A1c: less than 7%;  Is pt at goal? No, 10; A1C worsened from 7.9  Patient's SMBGs are not goal.     DIABETES MELLITUS TYPE 2 PLAN  Diabetes mellitus Type II, under poor control.  Reinforced healthy diet, lifestyle, and exercise.  Prescription medication ordered.  Continue with current treatment plan.  Rationale: SGLT2 for CKD/proteinuria, heart protection, mild weight loss, and T2DM control  Candidate for GLP? Yes; will start soon  Candidate for SGLT2? Yes; but sugars are very high; starting ESCOBAR to decrease BG and risk of AB  Candidate for DPP4? Not at this time; not GDMT  Candidate for Metformin? Watch GFR; 49  Candidate for Insulin? Not at this time; A1c >/= 11? holding off at this time    Medication Changes:   START  Glimepiride 2 mg once daily  WAIT TO START  Sending Jardiance to [facility] for PAP    Future Considerations:  Ozempic for CAD $47    Addressed ADA diet.  Suggested low cholesterol diet.  Encouraged aerobic exercise.  Discussed ways to avoid symptomatic hypoglycemia.  Reminded to bring in blood sugar diary at next visit.    Education:   Diet   Exercise    Clinical Pharmacist follow-up: 2 weeks, Telehealth visit    Continue all meds under the continuation of care with the referring provider and clinical pharmacy team.    Thank you,  Brittney Jeffers, PharmD   Clinical Pharmacist  Specialist, Primary Care   Phone: [phone]   Fax: [phone]   Email: [email or web address]    Verbal consent to manage patient's drug therapy was obtained from the patient. They were informed they may decline to participate or withdraw from participation in pharmacy services at any time        [1]   Past Medical History:  Diagnosis Date    Coronary arteriosclerosis 04/25/2023 11/24/2009 AVR: 25 mm Keon-Ring valve - mild on cath before surgery    Dorsalgia, unspecified 08/27/2019    Back pain, acute    Encounter for immunization 02/22/2016    Need for Tdap vaccination    Horseshoe tear of retina without detachment, left eye 02/22/2016    Retinal tear, left    Hypercholesterolemia 04/25/2023    Dr. Umana    Hypertension 04/25/2023    Overweight     Overweight (BMI 25.0-29.9)    Paroxysmal atrial fibrillation (Multi) 04/25/2023    On Coumadin. Had SDH d/t fall. No falls since.    Personal history of other diseases of the circulatory system 04/23/2020    History of atrial fibrillation    Personal history of other drug therapy 10/09/2015    History of influenza vaccination    Personal history of other endocrine, nutritional and metabolic disease 05/18/2018    History of diabetes mellitus    Personal history of other infectious and parasitic diseases     History of Legionnaire's disease    S/P AVR (aortic valve replacement) 04/25/2023 11/24/2009 AVR: 25 mm Keon-Ring valve    Stage 3 chronic kidney disease, unspecified whether stage 3a or 3b CKD (Multi) 08/06/2024    Subdural hematoma (Multi) 04/25/2023    Type 2 diabetes mellitus 04/25/2023   [2]   Past Surgical History:  Procedure Laterality Date    AORTIC VALVE REPLACEMENT  08/30/2021    Aortic Valve Replacement    CHOLECYSTECTOMY  11/26/2014    Cholecystectomy    COLONOSCOPY  10/25/2021    Complete Colonoscopy    OTHER SURGICAL HISTORY  08/30/2021    Cardiac catheterization   [3]   Family History  Problem Relation Name Age of  Onset    Other (parkinsons disease) Mother      Other (cardiac disorder) Father     [4]   Allergies  Allergen Reactions    Codeine Dizziness and Syncope

## 2025-06-06 ENCOUNTER — TELEPHONE (OUTPATIENT)
Dept: PHARMACY | Facility: HOSPITAL | Age: 80
End: 2025-06-06
Payer: MEDICARE

## 2025-06-06 NOTE — TELEPHONE ENCOUNTER
Patient Assistance Screening (VAF)  Patient verbally reports monthly or yearly income which is less than 400% federal poverty level  Application for program has been submitted for the following medications:   Jardiance  Patient aware this process may take up to 2 weeks once income documents have been sent to the team.  If approved, medication must be filled through [facility] pharmacy and may be picked up or mailed to patient.   If approved, medication will be billed through insurance, and patient assistance team will pay the copay. This will result in a $0 copay for the patient.  Counseled patient on mechanism of action, side effects, contraindications, and what to do if the patient misses a dose. All patients questions were answered.      Brittney Jeffers PharmD   Clinical Pharmacist Specialist, Primary Care   Phone: [phone]   Fax: [phone]   Email: [email or web address]

## 2025-06-12 PROCEDURE — RXMED WILLOW AMBULATORY MEDICATION CHARGE

## 2025-06-16 ENCOUNTER — PHARMACY VISIT (OUTPATIENT)
Dept: PHARMACY | Facility: CLINIC | Age: 80
End: 2025-06-16
Payer: COMMERCIAL

## 2025-06-16 ENCOUNTER — APPOINTMENT (OUTPATIENT)
Dept: PHARMACY | Facility: HOSPITAL | Age: 80
End: 2025-06-16
Payer: MEDICARE

## 2025-06-16 DIAGNOSIS — N18.30 STAGE 3 CHRONIC KIDNEY DISEASE, UNSPECIFIED WHETHER STAGE 3A OR 3B CKD (MULTI): ICD-10-CM

## 2025-06-16 DIAGNOSIS — I10 PRIMARY HYPERTENSION: ICD-10-CM

## 2025-06-16 DIAGNOSIS — E11.49 TYPE II DIABETES MELLITUS WITH NEUROLOGICAL MANIFESTATIONS (MULTI): Primary | ICD-10-CM

## 2025-06-16 DIAGNOSIS — I25.10 CORONARY ARTERIOSCLEROSIS: ICD-10-CM

## 2025-06-16 DIAGNOSIS — E66.813 CLASS 3 SEVERE OBESITY DUE TO EXCESS CALORIES WITH SERIOUS COMORBIDITY AND BODY MASS INDEX (BMI) OF 40.0 TO 44.9 IN ADULT: ICD-10-CM

## 2025-06-16 DIAGNOSIS — E78.00 HYPERCHOLESTEROLEMIA: ICD-10-CM

## 2025-06-16 DIAGNOSIS — L97.521 SKIN ULCER OF SMALL TOE OF LEFT FOOT, LIMITED TO BREAKDOWN OF SKIN: ICD-10-CM

## 2025-06-16 NOTE — PROGRESS NOTES
Clinical Pharmacy Appointment    Patient ID: [id number]  Iker Peralta is a 80 y.o. male who presents for Follow Up appointment. Reason for Referral: T2DM - Referring Provider and PCP: Abelardo Juarez, DO     Subjective     Medical History[1]     Surgical History[2]     Social Hx:   reports that he quit smoking about 36 years ago. His smoking use included cigars. He has been exposed to tobacco smoke. He has never used smokeless tobacco. He reports that he does not drink alcohol and does not use drugs.     Family History[3]     HPI    DIABETES MELLITUS TYPE 2  Diagnosed with diabetes:  >10 years. Known diabetic complications: autonomic neuropathy, chronic kidney disease, and obesity  Does patient follow with Endocrinology: No  Last optometry exam: <1 year   Most recent visit in Podiatry: <1 year  patient denies sores or cuts on feet today     Current diabetic medications include:  metformin   Clarifications to above regimen: has not started Jardiance due to cost  Adverse Effects: none    Past diabetic medications include:  ESCOBAR (glipizide)    Glucose Readings: BGs are high in the morning, BGs are high in the evening, BGs are high around dinner, BGs are high at bedtime  Glucometer/CGM Type: unknown  Patient tests BG 1 times per day  Current home BG readings:  FBG: Range (110 - 180); Average (160); # of readings above goal:   Sugars consistently >200?  Yes, 240 average with hba1c.    Range: 174 - 111  144, 11  6/14: 117  6/13    HYPOGLYCEMIA  Any episodes of hypoglycemia? No, never.    Did patient treat episode of hypoglycemia appropriately?    Does the patient have a prescription for ready-to-use Glucagon? no  Does pt have proteinuria? Yes    BMI: 42  Class III Obesity  Current Weight:  254 lbs  Lifestyle:  Diet: 2 meals/day.  BK: coffee 1-2 pieces of toast or bowl of wheat (no sugar)  DN: meat + vegetable + potato   Snacks: tries not to snack  Drinks: 8-9 glasses water, coffee in the AM, diet coke  Eats out 0 a week    Physical Activity:   No cane, cannot bend over well as back was broken after a fall (brain bleed in 2021)   _____________________________________________________________________________    PHARMACIST MEDICATION REVIEW    Drug Interactions  The following drug interactions were noted:    Drug-Drug: warfarin and simvastatinIncreased hypoprothrombinemic effects of warfarin may result in a risk for bleeding.  Metformin in renal dysfunction GFR <40    Medication System Management  [facility]'s [facility]   Adherence  Missed doses a week: 0  Pill organizer? unknown  Medication Access:  Can NOT afford medication  [facility] P[facility] Screen: eligible  Annual Income: $3500  $2,000 deductible   Jardiance and ozempic is ~$400     _____________________________________________________________________________    Labs  Hepatic dysfunction? NO  Kidney dysfunction? YES  Does pt have proteinuria? YES  _______________________________    Objective   Allergies[4]  Social History     Social History Narrative    Not on file      Medication Review  Current Outpatient Medications   Medication Instructions    cholecalciferol (VITAMIN D-3) 2,000 Units, oral, Daily    cyanocobalamin (VITAMIN B-12) 1,000 mcg, oral, Daily    empagliflozin (JARDIANCE) 10 mg, oral, Daily    glimepiride (AMARYL) 2 mg, oral, Daily before breakfast    metFORMIN XR (GLUCOPHAGE-XR) 2,000 mg, oral, Nightly    metoprolol tartrate (LOPRESSOR) 50 mg, oral, 2 times daily    potassium chloride ER (Micro-K) 10 mEq ER capsule 10 mEq, oral, Daily    simvastatin (ZOCOR) 40 mg, oral, Daily    tamsulosin (FLOMAX) 0.4 mg, oral, Daily    torsemide (DEMADEX) 10 mg, oral, Daily    warfarin (COUMADIN) 2 mg, oral, Every evening, Take 1 tablet daily except 1 1/2 tablets on ThursdayTake 1 tablet daily except 1 1/2 tablets on Thursday      Vitals  BP Readings from Last 2 Encounters:   05/20/25 122/70   02/18/25 124/72     BMI Readings from Last 1 Encounters:   05/20/25 41.62 kg/m²       Labs    A1C  Lab Results   Component Value Date    HGBA1C 10.0 (H) 2025    HGBA1C 7.9 (H) 2024    HGBA1C 8.4 (H) 2024     Lab Results   Component Value Date    CALCIUM 9.8 2025     (L) 2025    K 4.8 2025    CO2 23 2025     2025    BUN 21 2025    CREATININE 1.45 (H) 2025    EGFR 49 (L) 2025     Lab Results   Component Value Date    ALT 12 2024    AST 15 2024    ALKPHOS 58 2024    BILITOT 0.9 2024     FLP  Lab Results   Component Value Date    CHOL 119 2024    HDL 41.8 2024     Urine Microalbumin  Lab Results   Component Value Date    MICROALBCREA SEE COMMENT 2023     Weight Management  Wt Readings from Last 3 Encounters:   25 115 kg (254 lb)   25 117 kg (259 lb)   24 115 kg (254 lb)        _____________________________________________________________________________    Assessment/Plan   Problem List Items Addressed This Visit       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 Will have to be renewed prior to that date to prevent lapse in coverage. Medication(s) will be received at no cost to patient from  [facility]     Patient Goals  Fasting B - 130 mg/dL  Postprandial BG: less than 180 mg/dL  A1c: less than 7%;  Is pt at goal? No, 10; A1C worsened from 7.9  Patient's SMBGs are not goal.     DIABETES MELLITUS TYPE 2 PLAN  Diabetes mellitus Type II, under poor control.  Reinforced healthy diet, lifestyle, and exercise.  Prescription medication ordered.  Continue with current treatment plan.  Rationale: SGLT2 for CKD/proteinuria, heart protection, mild weight loss, and T2DM control  Candidate for GLP? Yes; will start soon  Candidate for SGLT2? Yes; but sugars are very high; starting ESCOBAR to decrease BG and risk of AB   SMFBG have decreased and now are within range and  regis   PAP approved - Jardiance   Candidate for DPP4? Not at this time; not GDMT  Candidate for Metformin? Watch GFR; 49  Candidate for Insulin? Not at this time; A1c >/= 11? holding off at this time    Medication Changes:   CONTINUE  Glimepiride 2 mg once daily  START  Jardiance     [place] [facility] [place]holding all meds?)    Future Considerations:  Ozempic for CAD $47  Stop metformin  Patient has bioprosthetic aortic valve --> can switch to eliquis and enroll in [facility]  Addressed ADA diet.  Suggested low cholesterol diet.  Encouraged aerobic exercise.  Discussed ways to avoid symptomatic hypoglycemia.  Reminded to bring in blood sugar diary at next visit.    Monitoring  RFP every 3 months for GFR for metformin 2g daily; once GFR <45 (49 on 5/12/25)  Weight/BMI  SMBG/hba1c    Education:   Diet   Exercise  Ozempic   Jardiance    Clinical Pharmacist follow-up: 1 week Telehealth visit  - BG/ADRs after starting Jardiance  - Weight  - needs refills on Jardiance  - update on urology appt/directions  - schedule cardiology appt    Continue all meds under the continuation of care with the referring provider and clinical pharmacy team.    Thank you,  Brittney Jeffers, PharmD   Clinical Pharmacist Specialist, Primary Care   Phone: [phone]   Fax: [phone]   Email: [email or web address]    Verbal consent to manage patient's drug therapy was obtained from the patient. They were informed they may decline to participate or withdraw from participation in pharmacy services at any time          [1]   Past Medical History:  Diagnosis Date    Coronary arteriosclerosis 04/25/2023 11/24/2009 AVR: 25 mm Keon-Ring valve - mild on cath before surgery    Dorsalgia, unspecified 08/27/2019    Back pain, acute    Encounter for immunization 02/22/2016    Need for Tdap vaccination    Horseshoe tear of retina without detachment, left eye 02/22/2016    Retinal tear, left    Hypercholesterolemia  04/25/2023    Dr. Umana    Hypertension 04/25/2023    Overweight     Overweight (BMI 25.0-29.9)    Paroxysmal atrial fibrillation (Multi) 04/25/2023    On Coumadin. Had SDH d/t fall. No falls since.    Personal history of other diseases of the circulatory system 04/23/2020    History of atrial fibrillation    Personal history of other drug therapy 10/09/2015    History of influenza vaccination    Personal history of other endocrine, nutritional and metabolic disease 05/18/2018    History of diabetes mellitus    Personal history of other infectious and parasitic diseases     History of Legionnaire's disease    S/P AVR (aortic valve replacement) 04/25/2023 11/24/2009 AVR: 25 mm Keon-Ring valve    Stage 3 chronic kidney disease, unspecified whether stage 3a or 3b CKD (Multi) 08/06/2024    Subdural hematoma (Multi) 04/25/2023    Type 2 diabetes mellitus 04/25/2023   [2]   Past Surgical History:  Procedure Laterality Date    AORTIC VALVE REPLACEMENT  08/30/2021    Aortic Valve Replacement    CHOLECYSTECTOMY  11/26/2014    Cholecystectomy    COLONOSCOPY  10/25/2021    Complete Colonoscopy    OTHER SURGICAL HISTORY  08/30/2021    Cardiac catheterization   [3]   Family History  Problem Relation Name Age of Onset    Other (parkinsons disease) Mother      Other (cardiac disorder) Father     [4]   Allergies  Allergen Reactions    Codeine Dizziness and Syncope

## 2025-06-19 ENCOUNTER — ANTICOAGULATION - WARFARIN VISIT (OUTPATIENT)
Dept: PHARMACY | Facility: CLINIC | Age: 80
End: 2025-06-19
Payer: MEDICARE

## 2025-06-19 DIAGNOSIS — I48.0 PAROXYSMAL ATRIAL FIBRILLATION (MULTI): Primary | Chronic | ICD-10-CM

## 2025-06-19 DIAGNOSIS — Z95.2 S/P AVR (AORTIC VALVE REPLACEMENT): Chronic | ICD-10-CM

## 2025-06-19 LAB
POC INR: 2.8 (ref 0.9–1.1)
POC PROTHROMBIN TIME: ABNORMAL (ref 9.3–12.5)

## 2025-06-19 PROCEDURE — 85610 PROTHROMBIN TIME: CPT | Mod: QW | Performed by: PHARMACIST

## 2025-06-19 PROCEDURE — 99212 OFFICE O/P EST SF 10 MIN: CPT | Performed by: PHARMACIST

## 2025-06-19 NOTE — PROGRESS NOTES
Coumadin Clinic Visit Note    Patient presents today for anticoagulation monitoring and adjustment.  Patient verified warfarin dosing schedule  Patient denies missing any doses  Patient denies unusual bruising or bleeding  Patient denies changes to  alcohol or tobacco use.  Jardiance  new to pt but he has not started yet and does not want to take it.    Consistent dietary green intake  There are no anticipated procedures at this time  INR Therapeutic today at 2.8  No changes to warfarin dose today  Next appointment 3 weeks      Oriana Ortiz, PharmD

## 2025-06-23 ENCOUNTER — APPOINTMENT (OUTPATIENT)
Dept: PHARMACY | Facility: HOSPITAL | Age: 80
End: 2025-06-23
Payer: MEDICARE

## 2025-06-23 DIAGNOSIS — Z95.2 S/P AVR (AORTIC VALVE REPLACEMENT): ICD-10-CM

## 2025-06-23 DIAGNOSIS — N18.30 STAGE 3 CHRONIC KIDNEY DISEASE, UNSPECIFIED WHETHER STAGE 3A OR 3B CKD (MULTI): ICD-10-CM

## 2025-06-23 DIAGNOSIS — E78.00 HYPERCHOLESTEROLEMIA: ICD-10-CM

## 2025-06-23 DIAGNOSIS — L97.521 SKIN ULCER OF SMALL TOE OF LEFT FOOT, LIMITED TO BREAKDOWN OF SKIN: ICD-10-CM

## 2025-06-23 DIAGNOSIS — I10 PRIMARY HYPERTENSION: ICD-10-CM

## 2025-06-23 DIAGNOSIS — E11.49 TYPE II DIABETES MELLITUS WITH NEUROLOGICAL MANIFESTATIONS (MULTI): Primary | ICD-10-CM

## 2025-06-23 DIAGNOSIS — E66.813 CLASS 3 SEVERE OBESITY DUE TO EXCESS CALORIES WITH SERIOUS COMORBIDITY AND BODY MASS INDEX (BMI) OF 40.0 TO 44.9 IN ADULT: ICD-10-CM

## 2025-06-23 DIAGNOSIS — I25.10 CORONARY ARTERIOSCLEROSIS: ICD-10-CM

## 2025-06-23 DIAGNOSIS — E66.01 SEVERE OBESITY (MULTI): ICD-10-CM

## 2025-06-23 DIAGNOSIS — I48.0 PAROXYSMAL ATRIAL FIBRILLATION (MULTI): ICD-10-CM

## 2025-06-23 RX ORDER — GLIMEPIRIDE 2 MG/1
2 TABLET ORAL
Qty: 90 TABLET | Refills: 0 | Status: SHIPPED | OUTPATIENT
Start: 2025-06-23 | End: 2025-09-21

## 2025-06-23 NOTE — PROGRESS NOTES
Clinical Pharmacy Appointment    Patient ID: [id number]  Iker Peralta is a 80 y.o. male who presents for Follow Up appointment. Reason for Referral: T2DM - Referring Provider and PCP: Abelardo Juarez, DO     Subjective     Medical History[1]     Surgical History[2]     Social Hx:   reports that he quit smoking about 36 years ago. His smoking use included cigars. He has been exposed to tobacco smoke. He has never used smokeless tobacco. He reports that he does not drink alcohol and does not use drugs.     Family History[3]     HPI    DIABETES MELLITUS TYPE 2  Diagnosed with diabetes:  >10 years. Known diabetic complications: autonomic neuropathy, chronic kidney disease, and obesity  Does patient follow with Endocrinology: No  Last optometry exam: <1 year   Most recent visit in Podiatry: <1 year  patient denies sores or cuts on feet today     Current diabetic medications include:  metformin   glimepiride  Clarifications to above regimen: has not started Jardiance due to cost  Adverse Effects: none    Past diabetic medications include:  ESCOBAR (glipizide)    Glucose Readings: BGs are high in the morning, BGs are high in the evening, BGs are high around dinner, BGs are high at bedtime  Glucometer/CGM Type: unknown  Patient tests BG 1 times per day  Current home BG readings: today patient states his FBG is ~120    Last appt  FBG: Range (110 - 180); Average (160); # of readings above goal: half; estimated most  Sugars consistently >200?  Yes, 240 average with hba1c.    Range: 174 - 111  Additional Readings : 44, 111, 117    HYPOGLYCEMIA  Any episodes of hypoglycemia? No, never.    Did patient treat episode of hypoglycemia appropriately?    Does the patient have a prescription for ready-to-use Glucagon? no  Does pt have proteinuria? Yes    BMI: 42  Class III Obesity  Current Weight:  254 lbs  Lifestyle:  Diet: 2 meals/day.  BK: coffee 1-2 pieces of toast or bowl of wheat (no sugar)  DN: meat + vegetable + potato   Snacks:  tries not to snack  Drinks: 8-9 glasses water, coffee in the AM, diet coke  Eats out 0 a week   Physical Activity:   No cane, cannot bend over well as back was broken after a fall (brain bleed in 2021)   _____________________________________________________________________________    PHARMACIST MEDICATION REVIEW    Drug Interactions  The following drug interactions were noted:    Drug-Drug: warfarin and simvastatinIncreased hypoprothrombinemic effects of warfarin may result in a risk for bleeding.  Metformin in renal dysfunction GFR <40    Medication System Management  [facility]'s preferred [facility]   Adherence  Missed doses a week: 0  Pill organizer? unknown  Medication Access:  Can NOT afford medication   PAP Screen: eligible   Patient Assistance for Jardiance approved through 6/5/26.   Annual Income: $3500  $2,000 deductible   Jardiance and ozempic is ~$400     _____________________________________________________________________________    Labs  Hepatic dysfunction? NO  Kidney dysfunction? YES  Does pt have proteinuria? YES  _______________________________    Objective   Allergies[4]  Social History     Social History Narrative    Not on file      Medication Review  Current Outpatient Medications   Medication Instructions    cholecalciferol (VITAMIN D-3) 2,000 Units, oral, Daily    cyanocobalamin (VITAMIN B-12) 1,000 mcg, oral, Daily    glimepiride (AMARYL) 2 mg, oral, Daily before breakfast    Jardiance 10 mg, oral, Daily    metFORMIN XR (GLUCOPHAGE-XR) 2,000 mg, oral, Nightly    metoprolol tartrate (LOPRESSOR) 50 mg, oral, 2 times daily    potassium chloride ER (Micro-K) 10 mEq ER capsule 10 mEq, oral, Daily    simvastatin (ZOCOR) 40 mg, oral, Daily    tamsulosin (FLOMAX) 0.4 mg, oral, Daily    torsemide (DEMADEX) 10 mg, oral, Daily    warfarin (COUMADIN) 2 mg, oral, Every evening, Take 1 tablet daily except 1 1/2 tablets on ThursdayTake 1 tablet daily except 1 1/2 tablets on Thursday       Vitals  BP Readings from Last 2 Encounters:   05/20/25 122/70   02/18/25 124/72     BMI Readings from Last 1 Encounters:   05/20/25 41.62 kg/m²      Labs    A1C  Lab Results   Component Value Date    HGBA1C 10.0 (H) 05/14/2025    HGBA1C 7.9 (H) 09/18/2024    HGBA1C 8.4 (H) 04/11/2024     Lab Results   Component Value Date    CALCIUM 9.8 05/14/2025     (L) 05/14/2025    K 4.8 05/14/2025    CO2 23 05/14/2025     05/14/2025    BUN 21 05/14/2025    CREATININE 1.45 (H) 05/14/2025    EGFR 49 (L) 05/14/2025     Lab Results   Component Value Date    ALT 12 04/11/2024    AST 15 04/11/2024    ALKPHOS 58 04/11/2024    BILITOT 0.9 04/11/2024     FLP  Lab Results   Component Value Date    CHOL 119 04/11/2024    HDL 41.8 04/11/2024     Urine Microalbumin  Lab Results   Component Value Date    MICROALBCREA SEE COMMENT 09/21/2023     Weight Management  Wt Readings from Last 3 Encounters:   05/20/25 115 kg (254 lb)   02/18/25 117 kg (259 lb)   11/19/24 115 kg (254 lb)        _____________________________________________________________________________    Assessment/Plan   Problem List Items Addressed This Visit       Coronary arteriosclerosis (Chronic)    Relevant Orders    Referral to Clinical Pharmacy    S/P AVR (aortic valve replacement) (Chronic)    Relevant Orders    Referral to Clinical Pharmacy    Hypercholesterolemia (Chronic)    Relevant Orders    Referral to Clinical Pharmacy    Hypertension (Chronic)    Relevant Orders    Referral to Clinical Pharmacy    Paroxysmal atrial fibrillation (Multi) (Chronic)    Relevant Orders    Referral to Clinical Pharmacy    Severe obesity (Multi)    Relevant Medications    glimepiride (AmaryL) 2 mg tablet    Other Relevant Orders    Referral to Clinical Pharmacy    Stage 3 chronic kidney disease, unspecified whether stage 3a or 3b CKD (Multi) (Chronic)    Relevant Medications    glimepiride (AmaryL) 2 mg tablet    Other Relevant Orders    Referral to Clinical Pharmacy    Skin  ulcer of small toe of left foot, limited to breakdown of skin    Relevant Medications    glimepiride (AmaryL) 2 mg tablet    Other Relevant Orders    Referral to Clinical Pharmacy    Type II diabetes mellitus with neurological manifestations (Multi) - Primary    Relevant Medications    glimepiride (AmaryL) 2 mg tablet    Other Relevant Orders    Referral to Clinical Pharmacy     Other Visit Diagnoses         Class 3 severe obesity due to excess calories with serious comorbidity and body mass index (BMI) of 40.0 to 44.9 in adult        Relevant Orders    Referral to Clinical Pharmacy        Patient Goals  Fasting B - 130 mg/dL  Postprandial BG: less than 180 mg/dL  A1c: less than 7%;  Is pt at goal? No, 10; A1C worsened from 7.9  Patient's SMBGs have improved     DIABETES MELLITUS TYPE 2 PLAN  Diabetes mellitus Type II, under poor control.  Reinforced healthy diet, lifestyle, and exercise.  Prescription medication ordered.  Continue with current treatment plan.    Patient has bladder procedure coming up and was told he would have to stop jardiance prior. In addition he wants to see his nephrologist prior to starting medication. Defers pharmacy management at this time    Rationale: HOLD SGLT2 for CKD/proteinuria, heart protection, mild weight loss, and T2DM control  Candidate for GLP? Yes; will start soon  Candidate for SGLT2? Yes; but sugars are very high; starting ESCOBAR to decrease BG and risk of AB   SMFBG have decreased and now are within range and regis   PAP approved - Jardiance   Candidate for DPP4? Not at this time; not GDMT  Candidate for Metformin? Watch GFR; 49  Candidate for Insulin? Not at this time; A1c >/= 11? holding off at this time    Medication Changes:   CONTINUE  Glimepiride 2 mg once daily  Metformin  WAIT to START  Jardiance   Future Considerations:  Ozempic for CAD $47  Stop metformin  Patient has bioprosthetic aortic valve --> can switch to eliquis and enroll in  PAP  Addressed ADA  diet.  Suggested low cholesterol diet.  Encouraged aerobic exercise.  Discussed ways to avoid symptomatic hypoglycemia.  Reminded to bring in blood sugar diary at next visit.    Monitoring  RFP every 3 months for GFR for metformin 2g daily; once GFR <45 (49 on 5/12/25)  Weight/BMI  SMBG/hba1c    Education:   Diet   Exercise  Ozempic   Jardiance    Clinical Pharmacist follow-up: 6 weeks Telehealth visit  - update on urology appt  - schedule cardiology appt  - nephrologist  - BG and labs; renal function for metformin    Continue all meds under the continuation of care with the referring provider and clinical pharmacy team.    Thank you,  Brittney Jeffers, PharmD   Clinical Pharmacist Specialist, Primary Care   Phone: [phone]   Fax: [phone]   Email: [email or web address]    Verbal consent to manage patient's drug therapy was obtained from the patient. They were informed they may decline to participate or withdraw from participation in pharmacy services at any time        [1]   Past Medical History:  Diagnosis Date    Coronary arteriosclerosis 04/25/2023 11/24/2009 AVR: 25 mm Keon-Ring valve - mild on cath before surgery    Dorsalgia, unspecified 08/27/2019    Back pain, acute    Encounter for immunization 02/22/2016    Need for Tdap vaccination    Horseshoe tear of retina without detachment, left eye 02/22/2016    Retinal tear, left    Hypercholesterolemia 04/25/2023    Dr. Umana    Hypertension 04/25/2023    Overweight     Overweight (BMI 25.0-29.9)    Paroxysmal atrial fibrillation (Multi) 04/25/2023    On Coumadin. Had SDH d/t fall. No falls since.    Personal history of other diseases of the circulatory system 04/23/2020    History of atrial fibrillation    Personal history of other drug therapy 10/09/2015    History of influenza vaccination    Personal history of other endocrine, nutritional and metabolic disease 05/18/2018    History of diabetes mellitus    Personal history of other  infectious and parasitic diseases     History of Legionnaire's disease    S/P AVR (aortic valve replacement) 04/25/2023 11/24/2009 AVR: 25 mm Keon-Ring valve    Stage 3 chronic kidney disease, unspecified whether stage 3a or 3b CKD (Multi) 08/06/2024    Subdural hematoma (Multi) 04/25/2023    Type 2 diabetes mellitus 04/25/2023   [2]   Past Surgical History:  Procedure Laterality Date    AORTIC VALVE REPLACEMENT  08/30/2021    Aortic Valve Replacement    CHOLECYSTECTOMY  11/26/2014    Cholecystectomy    COLONOSCOPY  10/25/2021    Complete Colonoscopy    OTHER SURGICAL HISTORY  08/30/2021    Cardiac catheterization   [3]   Family History  Problem Relation Name Age of Onset    Other (parkinsons disease) Mother      Other (cardiac disorder) Father     [4]   Allergies  Allergen Reactions    Codeine Dizziness and Syncope

## 2025-07-15 DIAGNOSIS — I48.0 PAROXYSMAL ATRIAL FIBRILLATION (MULTI): Primary | ICD-10-CM

## 2025-07-15 DIAGNOSIS — Z95.2 S/P AVR (AORTIC VALVE REPLACEMENT): ICD-10-CM

## 2025-07-17 ENCOUNTER — ANTICOAGULATION - WARFARIN VISIT (OUTPATIENT)
Dept: PHARMACY | Facility: CLINIC | Age: 80
End: 2025-07-17
Payer: MEDICARE

## 2025-07-17 DIAGNOSIS — Z95.2 S/P AVR (AORTIC VALVE REPLACEMENT): Chronic | ICD-10-CM

## 2025-07-17 DIAGNOSIS — I48.0 PAROXYSMAL ATRIAL FIBRILLATION (MULTI): Primary | Chronic | ICD-10-CM

## 2025-07-17 LAB
POC INR: 3.1 (ref 0.9–1.1)
POC PROTHROMBIN TIME: ABNORMAL (ref 9.3–12.5)

## 2025-07-17 PROCEDURE — 99212 OFFICE O/P EST SF 10 MIN: CPT | Performed by: PHARMACIST

## 2025-07-17 PROCEDURE — 85610 PROTHROMBIN TIME: CPT | Mod: QW | Performed by: PHARMACIST

## 2025-07-17 NOTE — PROGRESS NOTES
Iker Peralta is a 80 y.o. male with history of atrial fibrillation and s/p aortic valve replacement  who presents today for anticoagulation monitoring and adjustment.  INR 3.1 is slightly supra-therapeutic for this patient (goal range 2-3) and is reflective of 13 mg TWD  Patient verifies current dosing regimen, patient able to verbally recall dose  Patient reports 0  missed doses since last INR  Last INR 2.8 on 6/19/25 (4 week interval)  Patient denies s/sx clotting and/or stroke  Patient denies hematuria, epistaxis, rectal bleeding  Patient denies changes in diet, alcohol, or tobacco use  Vegetable intake consistent from week to week  Reviewed medication list and drug allergies with patient, updated any medication additions or modifications accordingly - patient started on glimepiride about two weeks ago - ok with INR // patient may also start Jardiance, but is waiting for his nephrologists' opinion   Acetaminophen intake: no changes   Patient also denies any pending medical or dental procedures scheduled at this time  Patient was instructed to continue with current therapy of warfarin 13mg TWD, eat one extra half serving of vegetables this week and RTC 4 weeks    Vandana Carreno, PharmD, BCPS   7/17/2025 11:52 AM

## 2025-07-22 ENCOUNTER — APPOINTMENT (OUTPATIENT)
Dept: NEPHROLOGY | Facility: CLINIC | Age: 80
End: 2025-07-22
Payer: MEDICARE

## 2025-07-22 VITALS
BODY MASS INDEX: 41.17 KG/M2 | DIASTOLIC BLOOD PRESSURE: 77 MMHG | HEART RATE: 79 BPM | WEIGHT: 256.2 LBS | SYSTOLIC BLOOD PRESSURE: 115 MMHG | HEIGHT: 66 IN

## 2025-07-22 DIAGNOSIS — I10 ESSENTIAL HYPERTENSION: ICD-10-CM

## 2025-07-22 DIAGNOSIS — N18.31 STAGE 3A CHRONIC KIDNEY DISEASE (MULTI): Primary | ICD-10-CM

## 2025-07-22 DIAGNOSIS — E08.22 DIABETES MELLITUS DUE TO UNDERLYING CONDITION WITH DIABETIC CHRONIC KIDNEY DISEASE, UNSPECIFIED CKD STAGE, UNSPECIFIED WHETHER LONG TERM INSULIN USE: ICD-10-CM

## 2025-07-22 PROCEDURE — 3078F DIAST BP <80 MM HG: CPT | Performed by: INTERNAL MEDICINE

## 2025-07-22 PROCEDURE — 99203 OFFICE O/P NEW LOW 30 MIN: CPT | Performed by: INTERNAL MEDICINE

## 2025-07-22 PROCEDURE — 3074F SYST BP LT 130 MM HG: CPT | Performed by: INTERNAL MEDICINE

## 2025-07-22 PROCEDURE — 1159F MED LIST DOCD IN RCRD: CPT | Performed by: INTERNAL MEDICINE

## 2025-07-22 NOTE — PROGRESS NOTES
Iker Peralta   80 y.o.      Vitals:    07/22/25 1428   Weight: 116 kg (256 lb 3.2 oz)      MRN/Room: [id number]/Room/bed info not found        History Of Present Illness  Iker Peralta is a 80 y.o. male presenting with high Cr level.     Past Medical History  He has a past medical history of Coronary arteriosclerosis (04/25/2023), Dorsalgia, unspecified (08/27/2019), Encounter for immunization (02/22/2016), Horseshoe tear of retina without detachment, left eye (02/22/2016), Hypercholesterolemia (04/25/2023), Hypertension (04/25/2023), Overweight, Paroxysmal atrial fibrillation (Multi) (04/25/2023), Personal history of other diseases of the circulatory system (04/23/2020), Personal history of other drug therapy (10/09/2015), Personal history of other endocrine, nutritional and metabolic disease (05/18/2018), Personal history of other infectious and parasitic diseases, S/P AVR (aortic valve replacement) (04/25/2023), Stage 3 chronic kidney disease, unspecified whether stage 3a or 3b CKD (Multi) (08/06/2024), Subdural hematoma (Multi) (04/25/2023), and Type 2 diabetes mellitus (04/25/2023).    Surgical History  He has a past surgical history that includes Cholecystectomy (11/26/2014); Colonoscopy (10/25/2021); Other surgical history (08/30/2021); and Aortic valve replacement (08/30/2021).     Social History  He reports that he quit smoking about 36 years ago. His smoking use included cigars. He has been exposed to tobacco smoke. He has never used smokeless tobacco. He reports that he does not drink alcohol and does not use drugs.    Family History  Family History[1]     Allergies  Codeine      Meds:         Current Medications[2]      ROS:  The patient is awake and oriented. No dizziness or lightheadedness. No chills and no fever. No headaches. No nausea and no vomiting. No shortness of breath. No cough. No chest pain. No abdominal pain. No diarrhea. No hematemesis or hemoptysis. No hematuria. No rectal bleeding. No  melena. No epistaxis. No urinary symptoms. No flank pain. No leg edema. No leg pain. No itching. Overall, the rest of the review of systems is also negative.  12 point review of systems otherwise negative as stated in HPI.        Physical Exam:        Vitals:    07/22/25 1428   BP: 115/77   Pulse: 79     General: The patient is awake, oriented, and is not in any distress.  Head and Neck: Normocephalic. No periorbital edema.  Eyes: Not icteric.   Respiratory: Symmetric chest expansion. No respiratory distress.  Skin: No maculopapular rash.  Musculoskeletal: No peripheral edema.  Neuro Exam: Speech is fluent. Moves extremities.        Blood Labs:  No results found for this or any previous visit (from the past 24 hours).   Lab Results   Component Value Date    GLUCOSE 208 (H) 05/14/2025    CALCIUM 9.8 05/14/2025     (L) 05/14/2025    K 4.8 05/14/2025    CO2 23 05/14/2025     05/14/2025    BUN 21 05/14/2025    CREATININE 1.45 (H) 05/14/2025       Imaging:  === 07/13/20 ===    - Impression -  Heterogeneous primarily hypoechoic masslike area on the left as  described at the inferior aspect of the testicle and could be  extratesticular causing mass effect on the adjacent testicle although  intratesticular process is not excluded. Differential considerations  include infectious/inflammatory process including epididymitis, tumor  involving testicle or epididymis, hematoma or other complex  collection, less likely testicular infarct. The right testicular  elsewhere is slightly hypervascular could indicate mild orchitis.  Striated echogenicity of the right testicle can be seen in the  setting of infection, tumor, or fibrosis. Clinical correlation and  follow-up advised.    Small right and trace left hydroceles. Small bilateral varicoceles.    Findings discussed with Dr. Johnson at 3:00 p.m. on 07/13/2020.      Assessment and Plan:  #1 chronic kidney disease stage III.  Last creatinine was 1.4.  He has history of  hypertension and diabetes.  He also has history of valve replacement.  As per kidney ultrasound, PTH, 25-hydroxy vitamin D, microscopic urinalysis, and spot urine protein creatinine ratio.    2.  Hypertension.  Blood pressure is under control.  Continue the current medications.    3.  Diabetes.  I asked for a spot urine protein to creatinine ratio.    I will see him in about 2 to 3 weeks for follow-up.    Dallas Gallardo MD  Senior Attending Physician  Director of Onco-Nephrology Program  Division of Nephrology & Hypertension  [facility]       [1]   Family History  Problem Relation Name Age of Onset    Other (parkinsons disease) Mother      Other (cardiac disorder) Father     [2]   Current Outpatient Medications   Medication Sig Dispense Refill    cholecalciferol (Vitamin D-3) 50 mcg (2,000 unit) capsule Take 1 capsule (2,000 Units) by mouth early in the morning.. 90 capsule 3    cyanocobalamin (Vitamin B-12) 1,000 mcg tablet Take 1 tablet (1,000 mcg) by mouth once daily. 90 tablet 3    glimepiride (AmaryL) 2 mg tablet Take 1 tablet (2 mg) by mouth once daily in the morning. Take before meals. 90 tablet 0    metFORMIN  mg 24 hr tablet Take 4 tablets (2,000 mg) by mouth once daily at bedtime. 360 tablet 3    metoprolol tartrate (Lopressor) 50 mg tablet Take 1 tablet by mouth 2 times a day. 180 tablet 1    potassium chloride ER (Micro-K) 10 mEq ER capsule Take 1 capsule (10 mEq) by mouth once daily. 90 capsule 0    simvastatin (Zocor) 40 mg tablet Take 1 tablet (40 mg) by mouth once daily. 90 tablet 3    tamsulosin (Flomax) 0.4 mg 24 hr capsule Take 1 capsule (0.4 mg) by mouth once daily. 90 capsule 3    torsemide (Demadex) 10 mg tablet Take 1 tablet (10 mg) by mouth once daily. 90 tablet 3    warfarin (Coumadin) 2 mg tablet Take 1 tablet (2 mg) by mouth once daily in the evening. Take 1 tablet daily except 1 1/2 tablets on ThursdayTake 1 tablet daily except 1 1/2 tablets on  Thursday (Patient taking differently: Take 1 tablet (2 mg) by mouth once daily in the evening. Take 1 tablet daily except 1 1/2 tablets on Saturdays) 100 tablet 2     No current facility-administered medications for this visit.

## 2025-07-23 ENCOUNTER — HOSPITAL ENCOUNTER (OUTPATIENT)
Dept: RADIOLOGY | Facility: HOSPITAL | Age: 80
Discharge: HOME | End: 2025-07-23
Payer: MEDICARE

## 2025-07-23 DIAGNOSIS — N18.31 STAGE 3A CHRONIC KIDNEY DISEASE (MULTI): ICD-10-CM

## 2025-07-23 DIAGNOSIS — I10 ESSENTIAL HYPERTENSION: ICD-10-CM

## 2025-07-23 DIAGNOSIS — E08.22 DIABETES MELLITUS DUE TO UNDERLYING CONDITION WITH DIABETIC CHRONIC KIDNEY DISEASE, UNSPECIFIED CKD STAGE, UNSPECIFIED WHETHER LONG TERM INSULIN USE: ICD-10-CM

## 2025-07-23 PROCEDURE — 76770 US EXAM ABDO BACK WALL COMP: CPT

## 2025-07-23 PROCEDURE — 76770 US EXAM ABDO BACK WALL COMP: CPT | Performed by: STUDENT IN AN ORGANIZED HEALTH CARE EDUCATION/TRAINING PROGRAM

## 2025-07-24 ENCOUNTER — TELEPHONE (OUTPATIENT)
Dept: PRIMARY CARE | Facility: CLINIC | Age: 80
End: 2025-07-24
Payer: MEDICARE

## 2025-07-24 DIAGNOSIS — E55.9 VITAMIN D DEFICIENCY: Primary | ICD-10-CM

## 2025-07-24 LAB
25(OH)D3+25(OH)D2 SERPL-MCNC: 20 NG/ML (ref 30–100)
ANION GAP SERPL CALCULATED.4IONS-SCNC: 9 MMOL/L (CALC) (ref 7–17)
BACTERIA #/AREA URNS HPF: NORMAL /HPF
BUN SERPL-MCNC: 22 MG/DL (ref 7–25)
BUN/CREAT SERPL: 17 (CALC) (ref 6–22)
CALCIUM SERPL-MCNC: 9.9 MG/DL (ref 8.6–10.3)
CHLORIDE SERPL-SCNC: 100 MMOL/L (ref 98–110)
CO2 SERPL-SCNC: 25 MMOL/L (ref 20–32)
CREAT SERPL-MCNC: 1.33 MG/DL (ref 0.7–1.28)
CREAT UR-MCNC: 51 MG/DL (ref 20–320)
EGFRCR SERPLBLD CKD-EPI 2021: 55 ML/MIN/1.73M2
GLUCOSE SERPL-MCNC: 107 MG/DL (ref 65–99)
HYALINE CASTS #/AREA URNS LPF: NORMAL /LPF
POTASSIUM SERPL-SCNC: 4.6 MMOL/L (ref 3.5–5.3)
PROT UR-MCNC: 6 MG/DL (ref 5–25)
PROT/CREAT UR: 0.12 MG/MG CREAT (ref 0.03–0.15)
PROT/CREAT UR: 118 MG/G CREAT (ref 25–148)
PTH-INTACT SERPL-MCNC: 113 PG/ML (ref 16–77)
RBC #/AREA URNS HPF: NORMAL /HPF
SERVICE CMNT-IMP: NORMAL
SODIUM SERPL-SCNC: 134 MMOL/L (ref 135–146)
SQUAMOUS #/AREA URNS HPF: NORMAL /HPF
WBC #/AREA URNS HPF: NORMAL /HPF

## 2025-07-24 RX ORDER — ERGOCALCIFEROL 1.25 MG/1
50000 CAPSULE ORAL
Qty: 6 CAPSULE | Refills: 2 | Status: SHIPPED | OUTPATIENT
Start: 2025-07-24

## 2025-07-24 NOTE — TELEPHONE ENCOUNTER
Pt called he had Bw and an ultrasound yesterday ordered by Dr Gallardo. Pt has a follow up in 3 weeks with Dr Gallardo. Wanted to know if you can give him the results pt saw the results and doesn't know what they mean.

## 2025-07-29 ENCOUNTER — TELEPHONE (OUTPATIENT)
Dept: NEPHROLOGY | Facility: CLINIC | Age: 80
End: 2025-07-29
Payer: MEDICARE

## 2025-07-29 NOTE — PROGRESS NOTES
Pt wants to know if you want him to continue his vitamin d 2000 units per day or discontinue it since you put him on the high dose vitamin d

## 2025-08-04 ENCOUNTER — APPOINTMENT (OUTPATIENT)
Dept: PHARMACY | Facility: HOSPITAL | Age: 80
End: 2025-08-04
Payer: MEDICARE

## 2025-08-04 DIAGNOSIS — E11.49 TYPE II DIABETES MELLITUS WITH NEUROLOGICAL MANIFESTATIONS (MULTI): ICD-10-CM

## 2025-08-04 DIAGNOSIS — E66.813 CLASS 3 SEVERE OBESITY DUE TO EXCESS CALORIES WITH SERIOUS COMORBIDITY AND BODY MASS INDEX (BMI) OF 40.0 TO 44.9 IN ADULT: Primary | ICD-10-CM

## 2025-08-04 DIAGNOSIS — E66.01 SEVERE OBESITY (MULTI): ICD-10-CM

## 2025-08-04 DIAGNOSIS — E78.00 HYPERCHOLESTEROLEMIA: ICD-10-CM

## 2025-08-04 DIAGNOSIS — I25.10 CORONARY ARTERIOSCLEROSIS: ICD-10-CM

## 2025-08-04 DIAGNOSIS — L97.521 SKIN ULCER OF SMALL TOE OF LEFT FOOT, LIMITED TO BREAKDOWN OF SKIN: ICD-10-CM

## 2025-08-04 DIAGNOSIS — N18.30 STAGE 3 CHRONIC KIDNEY DISEASE, UNSPECIFIED WHETHER STAGE 3A OR 3B CKD (MULTI): ICD-10-CM

## 2025-08-04 RX ORDER — GLIMEPIRIDE 2 MG/1
2 TABLET ORAL
Qty: 90 TABLET | Refills: 0 | Status: SHIPPED | OUTPATIENT
Start: 2025-08-04 | End: 2025-11-02

## 2025-08-04 NOTE — Clinical Note
Patient wants to discuss starting ozempic with PCP/nephro/cardio prior to starting medication. Defers pharmacy management at this time

## 2025-08-04 NOTE — PROGRESS NOTES
Clinical Pharmacy Appointment    Patient ID: [id number]  Iker Peralta is a 80 y.o. male who presents for Follow Up appointment. Reason for Referral: T2DM - Referring Provider and PCP: Abelardo Juarez, DO     Subjective     Medical History[1]     Surgical History[2]     Social Hx:   reports that he quit smoking about 36 years ago. His smoking use included cigars. He has been exposed to tobacco smoke. He has never used smokeless tobacco. He reports that he does not drink alcohol and does not use drugs.     Family History[3]     HPI    DIABETES MELLITUS TYPE 2  Diagnosed with diabetes:  >10 years. Known diabetic complications: autonomic neuropathy, chronic kidney disease, and obesity  Does patient follow with Endocrinology: No  Last optometry exam: <1 year   Most recent visit in Podiatry: <1 year  patient denies sores or cuts on feet today     Current diabetic medications include:  metformin   Glimepiride  Jardiance - started 1 week ago   Clarifications to above regimen: none  Adverse Effects: none    Past diabetic medications include:  ESCOBAR (glipizide)    Glucose Readings: BGs consistently in an acceptable range  Patient tests BG 1 times per day  Current home BG readings:   FBG: Range (108 - 130); Average (115); # of readings above goal: none reported  Sugars consistently >200? No, none  LAST APPT  Range: 174 - 111  Additional Readings : 44, 111, 117    HYPOGLYCEMIA  Any episodes of hypoglycemia? No, never.    Did patient treat episode of hypoglycemia appropriately?    Does the patient have a prescription for ready-to-use Glucagon? no  Does pt have proteinuria? Yes    BMI: 42 - Class III Obesity  Current Weight:  STARTING - 254 lbs  At home - 248 lbs   7/22/25 - 256 lbs  Lifestyle:  Diet: 2 meals/day.  BK: coffee and activia yogurt + bowl of fruit (cantaloupe and cutie orange)  1-2 pieces of toast or bowl of wheat (no sugar)  DN: meat + vegetable + potato   Snacks: tries not to snack  Drinks: 8-9 glasses water, coffee  in the AM, diet coke  Eats out 0 a week   Physical Activity:   No cane, cannot bend over well as back was broken after a fall (brain bleed in 2021)   _____________________________________________________________________________    PHARMACIST MEDICATION REVIEW    Drug Interactions  The following drug interactions were noted:    Drug-Drug: warfarin and simvastatinIncreased hypoprothrombinemic effects of warfarin may result in a risk for bleeding.  Metformin in renal dysfunction GFR <40    Medication System Management  Patient's preferred pharmacy: [facility]   Adherence  Missed doses a week: 0  Pill organizer? unknown  Medication Access:  Can NOT afford medication   PAP Screen: eligible   Patient Assistance for Jardiance approved through 6/5/26.   Annual Income: $3500  $2,000 deductible   Jardiance and ozempic is ~$400     _____________________________________________________________________________    Labs  Hepatic dysfunction? NO  Kidney dysfunction? YES  Does pt have proteinuria? YES  _______________________________    Objective   Allergies[4]  Social History     Social History Narrative    Not on file      Current Outpatient Medications   Medication Instructions    cholecalciferol (VITAMIN D-3) 2,000 Units, oral, Daily    cyanocobalamin (VITAMIN B-12) 1,000 mcg, oral, Daily    ergocalciferol (VITAMIN D-2) 50,000 Units, oral, Every 14 days    glimepiride (AMARYL) 2 mg, oral, Daily before breakfast    metFORMIN XR (GLUCOPHAGE-XR) 2,000 mg, oral, Nightly    metoprolol tartrate (LOPRESSOR) 50 mg, oral, 2 times daily    potassium chloride ER (Micro-K) 10 mEq ER capsule 10 mEq, oral, Daily    simvastatin (ZOCOR) 40 mg, oral, Daily    tamsulosin (FLOMAX) 0.4 mg, oral, Daily    torsemide (DEMADEX) 10 mg, oral, Daily    warfarin (COUMADIN) 2 mg, oral, Every evening, Take 1 tablet daily except 1 1/2 tablets on ThursdayTake 1 tablet daily except 1 1/2 tablets on Thursday      BP Readings from Last 2 Encounters:    25 115/77   25 122/70     BMI Readings from Last 1 Encounters:   25 41.99 kg/m²      A1C  Lab Results   Component Value Date    HGBA1C 10.0 (H) 2025    HGBA1C 7.9 (H) 2024    HGBA1C 8.4 (H) 2024     Lab Results   Component Value Date    CALCIUM 9.9 2025     (L) 2025    K 4.6 2025    CO2 25 2025     2025    BUN 22 2025    CREATININE 1.33 (H) 2025    EGFR 55 (L) 2025     Lab Results   Component Value Date    ALT 12 2024    AST 15 2024    ALKPHOS 58 2024    BILITOT 0.9 2024     FLP  Lab Results   Component Value Date    CHOL 119 2024    HDL 41.8 2024     Urine Microalbumin  Lab Results   Component Value Date    MICROALBCREA SEE COMMENT 2023     Weight Management  Wt Readings from Last 3 Encounters:   25 116 kg (256 lb 3.2 oz)   25 115 kg (254 lb)   25 117 kg (259 lb)     _____________________________________________________________________________    Assessment/Plan   Problem List Items Addressed This Visit       Coronary arteriosclerosis (Chronic)    Hypercholesterolemia (Chronic)    Severe obesity (Multi)    Stage 3 chronic kidney disease, unspecified whether stage 3a or 3b CKD (Multi) (Chronic)    Skin ulcer of small toe of left foot, limited to breakdown of skin     Other Visit Diagnoses         Class 3 severe obesity due to excess calories with serious comorbidity and body mass index (BMI) of 40.0 to 44.9 in adult    -  Primary          Patient Goals  Fasting B - 130 mg/dL  Postprandial BG: less than 180 mg/dL  A1c: less than 7%;  Is pt at goal? No, 10; A1C worsened from 7.9  Patient's SMBGs have improved     DIABETES MELLITUS TYPE 2 PLAN  Diabetes mellitus Type II, under poor control.  Reinforced healthy diet, lifestyle, and exercise.  Prescription medication ordered.  Continue with current treatment plan.    Patient wants to discuss starting ozempic  with PCP/nephro/cardio prior to starting medication.   Defers pharmacy management at this time    Plan to optimize medication to most effective and beneficial regimen based on patient specific risk factors and GDMT. Plan to stop ESCOBAR due to weight gain and high risk of hypoglycemia. Plan to eventually stop metformin due to ckd/GFR<35. Plan to start ozempic for MACE as patient has CAD, weight loss, T2DM improvement, and cvd/renal protection.    Rationale: CKD/proteinuria, heart protection, mild weight loss, and T2DM control  Candidate for GLP? Yes; will start soon  Wants to discuss with PCP, nephro, cardiologist   Candidate for SGLT2? Yes, started after ESCOBAR decreased BG to reduce risk of AB   SMFBG have decreased and now are within range and regis   PAP approved - Jardiance   Candidate for DPP4? Not at this time; not GDMT  Candidate for Metformin? renal function improved; ckd watch trend  Candidate for Insulin? Not at this time; A1c >/= 11? holding off at this time    Medication Changes:   CONTINUE  Glimepiride 2 mg once daily  Metformin  Jardiance     Future Considerations:  Ozempic for CAD $47  Stop metformin  Patient has bioprosthetic aortic valve --> can switch to eliquis and enroll in  PAP  Addressed ADA diet.  Suggested low cholesterol diet.  Encouraged aerobic exercise.  Discussed ways to avoid symptomatic hypoglycemia.  Reminded to bring in blood sugar diary at next visit.    Monitoring  RFP every 3 months for GFR for metformin 2g daily  Weight/BMI  SMBG/hba1c    Education:   Diet   Exercise  Ozempic   Jardiance    Clinical Pharmacist follow-up: 2 months Telehealth visit    Continue all meds under the continuation of care with the referring provider and clinical pharmacy team.    Thank you,  Brittney Jeffers, PharmD   Clinical Pharmacist Specialist, Primary Care   Phone: [phone]   Fax: [phone]   Email: [email or web address]    Verbal consent to manage patient's drug therapy was obtained from  the patient. They were informed they may decline to participate or withdraw from participation in pharmacy services at any time        [1]   Past Medical History:  Diagnosis Date    Coronary arteriosclerosis 04/25/2023 11/24/2009 AVR: 25 mm Keon-Ring valve - mild on cath before surgery    Dorsalgia, unspecified 08/27/2019    Back pain, acute    Encounter for immunization 02/22/2016    Need for Tdap vaccination    Horseshoe tear of retina without detachment, left eye 02/22/2016    Retinal tear, left    Hypercholesterolemia 04/25/2023    Dr. Umana    Hypertension 04/25/2023    Overweight     Overweight (BMI 25.0-29.9)    Paroxysmal atrial fibrillation (Multi) 04/25/2023    On Coumadin. Had SDH d/t fall. No falls since.    Personal history of other diseases of the circulatory system 04/23/2020    History of atrial fibrillation    Personal history of other drug therapy 10/09/2015    History of influenza vaccination    Personal history of other endocrine, nutritional and metabolic disease 05/18/2018    History of diabetes mellitus    Personal history of other infectious and parasitic diseases     History of Legionnaire's disease    S/P AVR (aortic valve replacement) 04/25/2023 11/24/2009 AVR: 25 mm Keon-Ring valve    Stage 3 chronic kidney disease, unspecified whether stage 3a or 3b CKD (Multi) 08/06/2024    Subdural hematoma (Multi) 04/25/2023    Type 2 diabetes mellitus 04/25/2023   [2]   Past Surgical History:  Procedure Laterality Date    AORTIC VALVE REPLACEMENT  08/30/2021    Aortic Valve Replacement    CHOLECYSTECTOMY  11/26/2014    Cholecystectomy    COLONOSCOPY  10/25/2021    Complete Colonoscopy    OTHER SURGICAL HISTORY  08/30/2021    Cardiac catheterization   [3]   Family History  Problem Relation Name Age of Onset    Other (parkinsons disease) Mother      Other (cardiac disorder) Father     [4]   Allergies  Allergen Reactions    Codeine Dizziness and Syncope

## 2025-08-12 ENCOUNTER — APPOINTMENT (OUTPATIENT)
Dept: NEPHROLOGY | Facility: CLINIC | Age: 80
End: 2025-08-12
Payer: MEDICARE

## 2025-08-12 VITALS
HEIGHT: 66 IN | BODY MASS INDEX: 40.34 KG/M2 | DIASTOLIC BLOOD PRESSURE: 62 MMHG | SYSTOLIC BLOOD PRESSURE: 120 MMHG | HEART RATE: 81 BPM | WEIGHT: 251 LBS

## 2025-08-12 DIAGNOSIS — E08.22 DIABETES MELLITUS DUE TO UNDERLYING CONDITION WITH DIABETIC CHRONIC KIDNEY DISEASE, UNSPECIFIED CKD STAGE, UNSPECIFIED WHETHER LONG TERM INSULIN USE: ICD-10-CM

## 2025-08-12 DIAGNOSIS — I10 ESSENTIAL HYPERTENSION: ICD-10-CM

## 2025-08-12 DIAGNOSIS — N18.31 STAGE 3A CHRONIC KIDNEY DISEASE (MULTI): Primary | ICD-10-CM

## 2025-08-12 PROCEDURE — 3078F DIAST BP <80 MM HG: CPT | Performed by: INTERNAL MEDICINE

## 2025-08-12 PROCEDURE — 3074F SYST BP LT 130 MM HG: CPT | Performed by: INTERNAL MEDICINE

## 2025-08-12 PROCEDURE — 1159F MED LIST DOCD IN RCRD: CPT | Performed by: INTERNAL MEDICINE

## 2025-08-12 PROCEDURE — 99214 OFFICE O/P EST MOD 30 MIN: CPT | Performed by: INTERNAL MEDICINE

## 2025-08-14 ENCOUNTER — ANTICOAGULATION - WARFARIN VISIT (OUTPATIENT)
Dept: PHARMACY | Facility: CLINIC | Age: 80
End: 2025-08-14
Payer: MEDICARE

## 2025-08-14 DIAGNOSIS — I48.0 PAROXYSMAL ATRIAL FIBRILLATION (MULTI): Primary | Chronic | ICD-10-CM

## 2025-08-14 DIAGNOSIS — Z95.2 S/P AVR (AORTIC VALVE REPLACEMENT): Chronic | ICD-10-CM

## 2025-08-14 LAB
POC INR: 2.6 (ref 0.9–1.1)
POC PROTHROMBIN TIME: ABNORMAL (ref 9.3–12.5)

## 2025-08-14 PROCEDURE — 99212 OFFICE O/P EST SF 10 MIN: CPT

## 2025-08-14 PROCEDURE — 85610 PROTHROMBIN TIME: CPT | Mod: QW

## 2025-10-06 ENCOUNTER — APPOINTMENT (OUTPATIENT)
Dept: PHARMACY | Facility: HOSPITAL | Age: 80
End: 2025-10-06
Payer: MEDICARE

## 2026-02-10 ENCOUNTER — APPOINTMENT (OUTPATIENT)
Dept: NEPHROLOGY | Facility: CLINIC | Age: 81
End: 2026-02-10
Payer: MEDICARE

## 2026-05-26 ENCOUNTER — APPOINTMENT (OUTPATIENT)
Dept: PRIMARY CARE | Facility: CLINIC | Age: 81
End: 2026-05-26
Payer: MEDICARE